# Patient Record
Sex: FEMALE | URBAN - METROPOLITAN AREA
[De-identification: names, ages, dates, MRNs, and addresses within clinical notes are randomized per-mention and may not be internally consistent; named-entity substitution may affect disease eponyms.]

---

## 2017-03-04 ENCOUNTER — OFFICE VISIT (OUTPATIENT)
Dept: URGENT CARE | Facility: URGENT CARE | Age: 27
End: 2017-03-04
Payer: COMMERCIAL

## 2017-03-04 ENCOUNTER — TELEPHONE (OUTPATIENT)
Dept: NURSING | Facility: CLINIC | Age: 27
End: 2017-03-04

## 2017-03-04 VITALS
WEIGHT: 126.13 LBS | TEMPERATURE: 97.7 F | SYSTOLIC BLOOD PRESSURE: 115 MMHG | DIASTOLIC BLOOD PRESSURE: 83 MMHG | HEART RATE: 78 BPM | BODY MASS INDEX: 21.31 KG/M2

## 2017-03-04 DIAGNOSIS — L20.89 OTHER ATOPIC DERMATITIS: Primary | ICD-10-CM

## 2017-03-04 DIAGNOSIS — L29.9 ITCHING: ICD-10-CM

## 2017-03-04 DIAGNOSIS — J30.89 SEASONAL ALLERGIC RHINITIS DUE TO OTHER ALLERGIC TRIGGER: ICD-10-CM

## 2017-03-04 PROCEDURE — 99214 OFFICE O/P EST MOD 30 MIN: CPT | Performed by: PHYSICIAN ASSISTANT

## 2017-03-04 RX ORDER — CETIRIZINE HYDROCHLORIDE 10 MG/1
10 TABLET ORAL EVERY EVENING
Qty: 30 TABLET | Refills: 1 | Status: SHIPPED | OUTPATIENT
Start: 2017-03-04 | End: 2017-06-15

## 2017-03-04 RX ORDER — METHYLPREDNISOLONE 4 MG
TABLET, DOSE PACK ORAL
Qty: 21 TABLET | Refills: 0 | Status: SHIPPED | OUTPATIENT
Start: 2017-03-04 | End: 2017-03-04

## 2017-03-04 RX ORDER — PERMETHRIN 50 MG/G
CREAM TOPICAL
Qty: 60 G | Refills: 1 | Status: SHIPPED | OUTPATIENT
Start: 2017-03-04 | End: 2017-06-15

## 2017-03-04 NOTE — MR AVS SNAPSHOT
"              After Visit Summary   3/4/2017    Mira Mariee    MRN: [id number]           Patient Information     Date Of Birth          1990        Visit Information        Provider Department      3/4/2017 10:15 AM Toribio Nicole PA-C [facility]        Today's Diagnoses     Other atopic dermatitis    -  1    Itching        Seasonal allergic rhinitis due to other allergic trigger           Follow-ups after your visit        Who to contact     If you have questions or need follow up information about today's clinic visit or your schedule please contact [facility] directly at [phone].  Normal or non-critical lab and imaging results will be communicated to you by "[employer]hart, letter or phone within 4 business days after the clinic has received the results. If you do not hear from us within 7 days, please contact the clinic through "[employer]hart or phone. If you have a critical or abnormal lab result, we will notify you by phone as soon as possible.  Submit refill requests through [employer] or call your pharmacy and they will forward the refill request to us. Please allow 3 business days for your refill to be completed.          Additional Information About Your Visit        MyChart Information     [employer] lets you send messages to your doctor, view your test results, renew your prescriptions, schedule appointments and more. To sign up, go to [email or web address] . Click on \"Log in\" on the left side of the screen, which will take you to the Welcome page. Then click on \"Sign up Now\" on the right side of the page.     You will be asked to enter the access code listed below, as well as some personal information. Please follow the directions to create your username and password.     Your access code is: [email or web address]  Expires: 2017 10:53 AM     Your access code will  in 90 days. If you need help or a new code, please call your [facility] clinic " or [phone].        Care EveryWhere ID     This is your Care EveryWhere ID. This could be used by other organizations to access your [place] medical records  [id number]        Your Vitals Were     Pulse Temperature BMI (Body Mass Index)             78 97.7  F (36.5  C) (Oral) 21.31 kg/m2          Blood Pressure from Last 3 Encounters:   03/04/17 115/83   03/31/16 110/70   10/29/15 112/73    Weight from Last 3 Encounters:   03/04/17 126 lb 2 oz (57.2 kg)   03/31/16 131 lb (59.4 kg)   10/29/15 139 lb (63 kg)              Today, you had the following     No orders found for display         Today's Medication Changes          These changes are accurate as of: 3/4/17 10:53 AM.  If you have any questions, ask your nurse or doctor.               Start taking these medicines.        Dose/Directions    cetirizine 10 MG tablet   Commonly known as:  zyrTEC   Used for:  Other atopic dermatitis, Itching   Started by:  Toribio Nicole PA-C        Dose:  10 mg   Take 1 tablet (10 mg) by mouth every evening   Quantity:  30 tablet   Refills:  1       permethrin 5 % cream   Commonly known as:  ELIMITE   Used for:  Itching   Started by:  Toribio Nicole PA-C        Apply cream from head to toe (except the face); leave on for 8-14 hours then wash off with water; reapply in 1 week if live mites appear.   Quantity:  60 g   Refills:  1            Where to get your medicines      These medications were sent to [facility] [id number] [place] [address] [address] [place] [facility] [address], [facility] [address]    Hours:  24-hours Phone:  [phone]     cetirizine 10 MG tablet    permethrin 5 % cream                Primary Care Provider Office Phone # Fax #    Marcia Edmond [phone]       [facility] [address] [place] [address]        Thank you!     Thank you for choosing [facility]  for your care. Our goal is always to  provide you with excellent care. Hearing back from our patients is one way we can continue to improve our services. Please take a few minutes to complete the written survey that you may receive in the mail after your visit with us. Thank you!             Your Updated Medication List - Protect others around you: Learn how to safely use, store and throw away your medicines at www.disposemymeds.org.          This list is accurate as of: 3/4/17 10:53 AM.  Always use your most recent med list.                   Brand Name Dispense Instructions for use    cetirizine 10 MG tablet    zyrTEC    30 tablet    Take 1 tablet (10 mg) by mouth every evening       IBUPROFEN PO      Reported on 3/4/2017       NO ACTIVE MEDICATIONS      .       permethrin 5 % cream    ELIMITE    60 g    Apply cream from head to toe (except the face); leave on for 8-14 hours then wash off with water; reapply in 1 week if live mites appear.       TRAMADOL HCL PO      Reported on 3/4/2017

## 2017-03-04 NOTE — TELEPHONE ENCOUNTER
"Call Type: Triage Call    Presenting Problem: Patient is \"itching all over,\" and has a \"rash\"  on\" chest\" and\" back along arms,\" and has \"tracks on chest area.\"  Patient states her daughter has the same symptoms and rash also.  Triaged for rash/Disosition to see provider within 24 hours.  Triage Note:  Guideline Title: Rash  Recommended Disposition: See Provider within 24 hours  Original Inclination: Wanted to speak with a nurse  Override Disposition:  Intended Action: See Dr/Demarcus Appt  Physician Contacted: No  Severe or persistent itching that interferes with ability to carry out usual  activities or sleep ?  YES  Rash with target-like appearance ? NO  Pregnant AND possible exposure to rubella ? NO  Severe breathing problems ? NO  Breathing problems ? NO  Known or suspected exposure to Poison [place], [place] OR [place] ? NO  Multiple red, fluid-filled lesions AND symptoms of upper respiratory infection  (URI) ? NO  New or worsening signs and symptoms that may indicate shock ? NO  Previously confirmed diagnosis of athlete's foot and similar symptoms ? NO  Known herpes zoster or undiagnosed blister-like rash on or near eye or on tip of  nose ? NO  Evaluated by provider AND symptoms worsening when following recommended treatment  plan ? NO  New rash/eruptions AND any temperature elevation in an immunocompromised  individual or frail elderly ? NO  Signs/symptoms of anaphylaxis ? NO  Possible exposure to chickenpox or has rash that looks like chickenpox ? NO  New onset of joint swelling or pain ? NO  Pregnant and rash ? NO  Known insect bite or sting ? NO  Skin changes that looks like hives (itchy welts or wheals) ? NO  Jaundice (yellowish tint to skin or whites of eyes) that is worsening or not  previously evaluated. ? NO  History of severe reaction after previous similar exposure to known allergen ? NO  First occurrence of mild allergic reaction (rash; hives; itching; nasal  congestion; watery, red eyes) that occurs within " minutes to several hours after  exposure to an allergen AND without any other signs or symptoms of anaphylaxis. ?  NO  Generalized rash AND not responding to 8 hours of home care ? NO  New onset of rash after beginning new prescribed, nonprescribed, or  alternative/complementary medication within last 10 days ? NO  Unexplained blood-colored (purple or red) flat pinpoint dots, spots or patches on  the skin ? NO  Foot or toe signs or symptoms complicated by diagnosed diabetes mellitus. ? NO  Any new OR worsening signs and symptoms of soft tissue infection ? NO  Physician Instructions:  Care Advice: Apply cool compresses for 30 minutes 4 to 6 times a day or take  bath/shower in cool water to relieve itching. Do not use hot water as this  may aggravate itching. Follow with application of a bland lotion such as  calamine (do not apply to the eyes or genitals).  Avoid sharing linens such as pillows, towels, or washcloths.  Speak with your provider before using an over-the-counter preparation(s) to  treat symptoms.  Call  if patient develops fainting  difficulty breathing or wheezing  stridor  continuous cough or sneezing  chest pain or tightness  hoarseness  facial swelling  swollen lips, mouth, tongue or throat  difficulty swallowing  generalized flushing, redness  new visual disturbance  or repeated vomiting.  CAUTIONS  Call provider for severe itching that is not relieved with home care  treatments.  Keep fingernails short.  For symptom relief, consider nonprescription antihistamines (such as  Allerest, Allegra, Claritin, Zyrtec, Chlor-Trimetron, Benadryl, etc.) as  directed on label or by pharmacist. Drowsiness may result, especially in  geriatric patients. Non-sedating antihistamines are available without a  prescription.  Reduce the risk of infection by washing your hands briskly using warm water  and soap for at least 20 seconds, or use a 62% alcohol-based hand   before or after touching the area.

## 2017-03-04 NOTE — PROGRESS NOTES
SUBJECTIVE:   Mira Mariee is a 26 year old female presenting with a chief complaint of allergies and now itching.  Onset of symptoms was 1-2 weeks  ago.  Course of illness is worsening.    Severity moderate  Current and Associated symptoms: rash on askin  Treatment measures tried include none.  Predisposing factors include cat allergy.    PMH:  Seasonal allergies     Allergies   Allergen Reactions     Advair [Advair Diskus]      Made chest tightness worse     Animal Dander      Nuts      Trees          Social History   Substance Use Topics     Smoking status: Current Every Day Smoker     Packs/day: 0.50     Years: 8.00     Types: Cigarettes     Smokeless tobacco: Never Used      Comment: .5 pack/day and E cig     Alcohol use No       ROS:  CONSTITUTIONAL:NEGATIVE for fever, chills, change in weight  INTEGUMENTARY/SKIN: POSITIVE for scratches on skin  ENT/MOUTH: NEGATIVE for ear, mouth and throat problems  RESP:NEGATIVE for significant cough or SOB  CV: NEGATIVE for chest pain, palpitations or peripheral edema  GI: NEGATIVE for nausea, abdominal pain, heartburn, or change in bowel habits  MUSCULOSKELETAL: NEGATIVE for significant arthralgias or myalgia  NEURO: NEGATIVE for weakness, dizziness or paresthesias    OBJECTIVE  :/83 (BP Location: Left arm, Patient Position: Chair, Cuff Size: Adult Regular)  Pulse 78  Temp 97.7  F (36.5  C) (Oral)  Wt 126 lb 2 oz (57.2 kg)  BMI 21.31 kg/m2  GENERAL APPEARANCE: healthy, alert and no distress  EYES: EOMI,  PERRL, conjunctiva clear  HENT: ear canals and TM's normal.  Nose and mouth without ulcers, erythema or lesions  NECK: supple, nontender, no lymphadenopathy  RESP: lungs clear to auscultation - no rales, rhonchi or wheezes  CV: regular rates and rhythm, normal S1 S2, no murmur noted  NEURO: Normal strength and tone, sensory exam grossly normal,  normal speech and mentation  SKIN: Positive for scratches on skin    ASSESSMENT/PLAN;      ICD-10-CM    1. Other  atopic dermatitis L20.89 cetirizine (ZYRTEC) 10 MG tablet     DISCONTINUED: methylPREDNISolone (MEDROL DOSEPAK) 4 MG tablet   2. Itching L29.9 cetirizine (ZYRTEC) 10 MG tablet     permethrin (ELIMITE) 5 % cream     DISCONTINUED: methylPREDNISolone (MEDROL DOSEPAK) 4 MG tablet   3. Seasonal allergic rhinitis due to other allergic trigger J30.89 cetirizine (ZYRTEC) 10 MG tablet       Due to her daughter itching, partner and mom itching will cover her and her daughter with elimite in the event there is some underlying scabies  Follow up with derm

## 2017-03-04 NOTE — NURSING NOTE
"Chief Complaint   Patient presents with     Derm Problem     itchy skin over various parts of body.        Initial /83 (BP Location: Left arm, Patient Position: Chair, Cuff Size: Adult Regular)  Pulse 78  Temp 97.7  F (36.5  C) (Oral)  Wt 126 lb 2 oz (57.2 kg)  BMI 21.31 kg/m2 Estimated body mass index is 21.31 kg/(m^2) as calculated from the following:    Height as of 3/31/16: 5' 4.5\" (1.638 m).    Weight as of this encounter: 126 lb 2 oz (57.2 kg).  Medication Reconciliation: complete    "

## 2017-04-26 ENCOUNTER — TELEPHONE (OUTPATIENT)
Dept: DERMATOLOGY | Facility: CLINIC | Age: 27
End: 2017-04-26

## 2017-04-26 NOTE — TELEPHONE ENCOUNTER
Reason for Call:  Other     Detailed comments: Pt was seen yesterday at [facility] in [place] and referred her to see a dermatologist for a staph infection all over her body. She has been taking SMX/TMP and it is not working. She has an appt for 05/08 with Dr. Mark - would like to get in sooner - Please advise.     Phone Number Patient can be reached at: Home number on file [phone] (home)    Best Time: Any    Can we leave a detailed message on this number? YES    Call taken on 4/26/2017 at 9:51 AM by Denise Behrendt

## 2017-04-26 NOTE — TELEPHONE ENCOUNTER
Spoke with patient who stated she had been diagnosed with a staff infection on her skin and has been treated with bactrim with no success. Patient stated she has a referral from her PCP to be seen in Derm. Patient stated the rash is getting worse on her and her daughter who is also being treated for staff but was never tested. She reported that her daughters rash is also getting worse. Appt was made for Friday and daughter was scheduled as well at same time. Mother stated records would be faxed.  Briana LEON RN BSN PHN  Specialty Clinics

## 2017-04-28 ENCOUNTER — TELEPHONE (OUTPATIENT)
Dept: DERMATOLOGY | Facility: CLINIC | Age: 27
End: 2017-04-28

## 2017-04-28 NOTE — TELEPHONE ENCOUNTER
Called pt and was unable to leave message as voicemail was full. Pt had appt for her and daughter this morning and no showed.  schedule today later. Pt would need to call PCP or go to urgent care for treatment. Pt may make appt for next available.   Briana LEON RN BSN PHN  Specialty Clinics

## 2017-04-28 NOTE — TELEPHONE ENCOUNTER
Reason for Call:  Patient is calling in states that the itching is getting worse. Patient is tearful    Detailed comments: please advise    Phone Number Patient can be reached at: Home number on file [phone] (home)    Best Time: any    Can we leave a detailed message on this number? YES    Call taken on 4/28/2017 at 11:38 AM by Grace Nash

## 2017-04-28 NOTE — TELEPHONE ENCOUNTER
Spoke with pt and she reported that she went to the ED near her home and received prednisone. Appt was made for pt on 5/3/17 and pt stated she would be here that day.  Pt reported that she wrote the wrong time down for her appt today. Advised pt to cancel appt on 5/8 if not needed. Pt verbalized understanding.  Briana LEON RN BSN PHN  Specialty Clinics

## 2017-05-03 ENCOUNTER — OFFICE VISIT (OUTPATIENT)
Dept: DERMATOLOGY | Facility: CLINIC | Age: 27
End: 2017-05-03
Payer: COMMERCIAL

## 2017-05-03 VITALS
BODY MASS INDEX: 22.68 KG/M2 | HEIGHT: 63 IN | SYSTOLIC BLOOD PRESSURE: 117 MMHG | WEIGHT: 128 LBS | HEART RATE: 76 BPM | DIASTOLIC BLOOD PRESSURE: 74 MMHG

## 2017-05-03 DIAGNOSIS — L29.9 ITCHING: Primary | ICD-10-CM

## 2017-05-03 LAB
ALBUMIN SERPL-MCNC: 3.6 G/DL (ref 3.4–5)
ALBUMIN UR-MCNC: NEGATIVE MG/DL
ALP SERPL-CCNC: 48 U/L (ref 40–150)
ALT SERPL W P-5'-P-CCNC: 23 U/L (ref 0–50)
ANION GAP SERPL CALCULATED.3IONS-SCNC: 7 MMOL/L (ref 3–14)
APPEARANCE UR: NORMAL
AST SERPL W P-5'-P-CCNC: 15 U/L (ref 0–45)
BILIRUB SERPL-MCNC: 0.2 MG/DL (ref 0.2–1.3)
BILIRUB UR QL STRIP: NEGATIVE
BUN SERPL-MCNC: 12 MG/DL (ref 7–30)
CALCIUM SERPL-MCNC: 8.2 MG/DL (ref 8.5–10.1)
CHLORIDE SERPL-SCNC: 104 MMOL/L (ref 94–109)
CO2 SERPL-SCNC: 26 MMOL/L (ref 20–32)
COLOR UR AUTO: YELLOW
CREAT SERPL-MCNC: 0.7 MG/DL (ref 0.52–1.04)
ERYTHROCYTE [DISTWIDTH] IN BLOOD BY AUTOMATED COUNT: 13.8 % (ref 10–15)
FERRITIN SERPL-MCNC: 40 NG/ML (ref 12–150)
GFR SERPL CREATININE-BSD FRML MDRD: ABNORMAL ML/MIN/1.7M2
GLUCOSE SERPL-MCNC: 100 MG/DL (ref 70–99)
GLUCOSE UR STRIP-MCNC: NEGATIVE MG/DL
HCT VFR BLD AUTO: 40 % (ref 35–47)
HGB BLD-MCNC: 14 G/DL (ref 11.7–15.7)
HGB UR QL STRIP: NEGATIVE
IRON SATN MFR SERPL: 35 % (ref 15–46)
IRON SERPL-MCNC: 109 UG/DL (ref 35–180)
KETONES UR STRIP-MCNC: NEGATIVE MG/DL
LDH SERPL L TO P-CCNC: 208 U/L (ref 81–234)
LEUKOCYTE ESTERASE UR QL STRIP: NEGATIVE
MCH RBC QN AUTO: 29.4 PG (ref 26.5–33)
MCHC RBC AUTO-ENTMCNC: 35 G/DL (ref 31.5–36.5)
MCV RBC AUTO: 84 FL (ref 78–100)
MUCOUS THREADS #/AREA URNS LPF: ABNORMAL /LPF
NITRATE UR QL: NEGATIVE
NON-SQ EPI CELLS #/AREA URNS LPF: ABNORMAL /LPF
PH UR STRIP: 5.5 PH (ref 5–7)
PLATELET # BLD AUTO: 292 10E9/L (ref 150–450)
POTASSIUM SERPL-SCNC: 4 MMOL/L (ref 3.4–5.3)
PROT SERPL-MCNC: 6.8 G/DL (ref 6.8–8.8)
RBC # BLD AUTO: 4.76 10E12/L (ref 3.8–5.2)
RBC #/AREA URNS AUTO: ABNORMAL /HPF (ref 0–2)
SODIUM SERPL-SCNC: 137 MMOL/L (ref 133–144)
SP GR UR STRIP: >1.03 (ref 1–1.03)
TIBC SERPL-MCNC: 313 UG/DL (ref 240–430)
TSH SERPL DL<=0.005 MIU/L-ACNC: 0.68 MU/L (ref 0.4–4)
URATE CRY #/AREA URNS HPF: ABNORMAL /HPF
URN SPEC COLLECT METH UR: NORMAL
UROBILINOGEN UR STRIP-ACNC: 1 EU/DL (ref 0.2–1)
WBC # BLD AUTO: 7.5 10E9/L (ref 4–11)
WBC #/AREA URNS AUTO: ABNORMAL /HPF (ref 0–2)

## 2017-05-03 PROCEDURE — 86704 HEP B CORE ANTIBODY TOTAL: CPT | Performed by: DERMATOLOGY

## 2017-05-03 PROCEDURE — 84630 ASSAY OF ZINC: CPT | Mod: 90 | Performed by: DERMATOLOGY

## 2017-05-03 PROCEDURE — 84425 ASSAY OF VITAMIN B-1: CPT | Mod: 90 | Performed by: DERMATOLOGY

## 2017-05-03 PROCEDURE — 99203 OFFICE O/P NEW LOW 30 MIN: CPT | Mod: 25 | Performed by: DERMATOLOGY

## 2017-05-03 PROCEDURE — 81001 URINALYSIS AUTO W/SCOPE: CPT | Performed by: DERMATOLOGY

## 2017-05-03 PROCEDURE — 84443 ASSAY THYROID STIM HORMONE: CPT | Performed by: DERMATOLOGY

## 2017-05-03 PROCEDURE — 00000402 ZZHCL STATISTIC TOTAL PROTEIN: Performed by: DERMATOLOGY

## 2017-05-03 PROCEDURE — 80053 COMPREHEN METABOLIC PANEL: CPT | Performed by: DERMATOLOGY

## 2017-05-03 PROCEDURE — 36415 COLL VENOUS BLD VENIPUNCTURE: CPT | Performed by: DERMATOLOGY

## 2017-05-03 PROCEDURE — 83540 ASSAY OF IRON: CPT | Performed by: DERMATOLOGY

## 2017-05-03 PROCEDURE — 85027 COMPLETE CBC AUTOMATED: CPT | Performed by: DERMATOLOGY

## 2017-05-03 PROCEDURE — 83550 IRON BINDING TEST: CPT | Performed by: DERMATOLOGY

## 2017-05-03 PROCEDURE — 82728 ASSAY OF FERRITIN: CPT | Performed by: DERMATOLOGY

## 2017-05-03 PROCEDURE — 88305 TISSUE EXAM BY PATHOLOGIST: CPT | Performed by: DERMATOLOGY

## 2017-05-03 PROCEDURE — 11100 HC BIOPSY SKIN/SUBQ/MUC MEM, SINGLE LESION: CPT | Performed by: DERMATOLOGY

## 2017-05-03 PROCEDURE — 88342 IMHCHEM/IMCYTCHM 1ST ANTB: CPT | Performed by: DERMATOLOGY

## 2017-05-03 PROCEDURE — 84165 PROTEIN E-PHORESIS SERUM: CPT | Performed by: DERMATOLOGY

## 2017-05-03 PROCEDURE — 88312 SPECIAL STAINS GROUP 1: CPT | Performed by: DERMATOLOGY

## 2017-05-03 PROCEDURE — 83615 LACTATE (LD) (LDH) ENZYME: CPT | Performed by: DERMATOLOGY

## 2017-05-03 PROCEDURE — 88341 IMHCHEM/IMCYTCHM EA ADD ANTB: CPT | Performed by: DERMATOLOGY

## 2017-05-03 PROCEDURE — 86803 HEPATITIS C AB TEST: CPT | Performed by: DERMATOLOGY

## 2017-05-03 PROCEDURE — 99000 SPECIMEN HANDLING OFFICE-LAB: CPT | Performed by: DERMATOLOGY

## 2017-05-03 RX ORDER — CETIRIZINE HYDROCHLORIDE 10 MG/1
TABLET ORAL
Qty: 90 TABLET | Refills: 3 | Status: SHIPPED | OUTPATIENT
Start: 2017-05-03 | End: 2017-06-15

## 2017-05-03 RX ORDER — DOXEPIN HYDROCHLORIDE 50 MG/1
50 CAPSULE ORAL AT BEDTIME
Qty: 60 CAPSULE | Refills: 2 | Status: SHIPPED | OUTPATIENT
Start: 2017-05-03 | End: 2017-06-15

## 2017-05-03 RX ORDER — SULFAMETHOXAZOLE AND TRIMETHOPRIM 200; 40 MG/5ML; MG/5ML
10 SUSPENSION ORAL 2 TIMES DAILY
COMMUNITY
End: 2017-06-15

## 2017-05-03 NOTE — PROGRESS NOTES
"Mira Mariee is a 26 year old year old female patient here today for itching all over.   .  Patient states this has been present for monhts.  Patient reports the following symptoms:  Itching, \"bugs crawling under my skin\".  Patient reports the following previous treatments benadryl.  Patient reports the following modifying factors none.  Associated symptoms: none.  Patient has no other skin complaints today.  Remainder of the HPI, Meds, PMH, Allergies, FH, and SH was reviewed in chart.    History reviewed. No pertinent past medical history.    History reviewed. No pertinent surgical history.     Family History   Problem Relation Age of Onset     DIABETES Mother      Hypertension Mother      Coronary Artery Disease No family hx of      Breast Cancer No family hx of      Colon Cancer No family hx of      Prostate Cancer No family hx of      Other Cancer No family hx of        Social History     Social History     Marital status: Single     Spouse name: N/A     Number of children: N/A     Years of education: N/A     Occupational History     Not on file.     Social History Main Topics     Smoking status: Current Every Day Smoker     Packs/day: 1.00     Years: 8.00     Types: Cigarettes     Smokeless tobacco: Never Used      Comment: 1 pack/day and E cig     Alcohol use No     Drug use: No     Sexual activity: No     Other Topics Concern     Not on file     Social History Narrative       Outpatient Encounter Prescriptions as of 5/3/2017   Medication Sig Dispense Refill     sulfamethoxazole-trimethoprim (BACTRIM/SEPTRA) suspension Take 10 mg/kg/day by mouth 2 times daily Dose based on TMP component.       HYDROXYZINE HCL PO Take 25 mg by mouth daily       doxepin (SINEQUAN) 50 MG capsule Take 1 capsule (50 mg) by mouth At Bedtime 60 capsule 2     cetirizine (ZYRTEC) 10 MG tablet Two tablets in morning 90 tablet 3     cetirizine (ZYRTEC) 10 MG tablet Take 1 tablet (10 mg) by mouth every evening (Patient not " "taking: Reported on 3/4/2017) 30 tablet 1     permethrin (ELIMITE) 5 % cream Apply cream from head to toe (except the face); leave on for 8-14 hours then wash off with water; reapply in 1 week if live mites appear. (Patient not taking: Reported on 5/3/2017) 60 g 1     IBUPROFEN PO Reported on 3/4/2017       TRAMADOL HCL PO Reported on 5/3/2017       NO ACTIVE MEDICATIONS .       No facility-administered encounter medications on file as of 5/3/2017.              Review Of Systems  Skin: As above  Eyes: negative  Ears/Nose/Throat: negative  Respiratory: No shortness of breath, dyspnea on exertion, cough, or hemoptysis  Cardiovascular: negative  Gastrointestinal: negative  Genitourinary: negative  Musculoskeletal: negative  Neurologic: negative  Psychiatric: negative  Hematologic/Lymphatic/Immunologic: negative  Endocrine: negative      O:   NAD, WDWN, Alert & Oriented, Mood & Affect wnl, Vitals stable   Here today alone   /74 (BP Location: Left arm)  Pulse 76  Ht 1.6 m (5' 3\")  Wt 58.1 kg (128 lb)  BMI 22.67 kg/m2   General appearance normal   Vitals stable   Alert, oriented and visably crying, anxious, moving on exam table      Following lymph nodes palpated: Occipital, Cervical, Supraclavicular n olad   Excoriations on scalp and trunk no primary skin lesions         The remainder of expanded problem focused exam was unremarkable; the following areas were examined:  scalp/hair, conjunctiva/lids, face, neck, lips, chest, digits/nails, RUE, LUE.      Eyes: Conjunctivae/lids:Normal     ENT: Lips, buccal mucosa, tongue: normal    MSK:Normal    Cardiovascular: peripheral edema none    Pulm: Breathing Normal    Lymph Nodes: No Head and Neck Lymphadenopathy     Neuro/Psych: Orientation:Normal; Mood/Affect:Normal      A/P:  1. Excoriations  TANGENTIAL BIOPSY SENT OUT:  After consent, anesthesia with LEC and prep, tangential excision performed and specimen sent out for permanent section histology.  No complications " and routine wound care. Patient told to call our office in 1-2 weeks for result.      R/o fungus v excoriation v folliculitis   Check cbc, iron, tsh, cmp, urine a, hep panel, ldh, spep  doexpin bedtime  Zyrtec in monring  F/u pending results  Skin care regimen reviewed with patient: Eliminate harsh soaps, i.e. Dial, zest, irsih spring; Mild soaps such as Cetaphil or Dove sensitive skin, avoid hot or cold showers, aggressive use of emollients including vanicream, cetaphil or cerave discussed with patient.

## 2017-05-03 NOTE — NURSING NOTE
"Chief Complaint   Patient presents with     Derm Problem     present for months. all over. DX with staph infection in April. Not getting better. taking smx/tmp and hydroxyzine       Initial /74 (BP Location: Left arm)  Pulse 76  Ht 1.6 m (5' 3\")  Wt 58.1 kg (128 lb)  BMI 22.67 kg/m2 Estimated body mass index is 22.67 kg/(m^2) as calculated from the following:    Height as of this encounter: 1.6 m (5' 3\").    Weight as of this encounter: 58.1 kg (128 lb).  Medication Reconciliation: complete    "

## 2017-05-03 NOTE — PATIENT INSTRUCTIONS
We did a biopsy and blood work today. We have sent RX to the pharmacy to help with the itching. Start the medication for now. We will contact you over the next few days with your test results.  Stop the SMX and the Hydroxyzine.     Proper skin care from Dr. Mark- [facility]     Eliminate harsh soaps, i.e. Dial, Zest, [place] Spring;   Use mild soaps such as Cetaphil or Dove Sensitive Skin   Avoid hot or cold showers   After showering, lightly dry off.    Aggressive use of a moisturizer (including Vanicream, Cetaphil, Aquafor or Cerave)   We recommend using a tub that needs to be scooped out, not a pump. This has more of an oil base. It will hold moisture in your skin much better than a water base moisturizer. The ones recommended are non- pore clogging.       If you have any questions call [phone] and follow the prompts to Dr. Mark's office.

## 2017-05-03 NOTE — MR AVS SNAPSHOT
After Visit Summary   5/3/2017    Mira Mariee    MRN: [id number]           Patient Information     Date Of Birth          1990        Visit Information        Provider Department      5/3/2017 12:45 PM Orville Mark MD [facility]        Care Instructions    We did a biopsy and blood work today. We have sent RX to the pharmacy to help with the itching. Start the medication for now. We will contact you over the next few days with your test results.  Stop the SMX and the Hydroxyzine.     Proper skin care from Dr. Mark- [facility]     Eliminate harsh soaps, i.e. Dial, Zest, Beryl Spring;   Use mild soaps such as Cetaphil or Dove Sensitive Skin   Avoid hot or cold showers   After showering, lightly dry off.    Aggressive use of a moisturizer (including Vanicream, Cetaphil, Aquafor or Cerave)   We recommend using a tub that needs to be scooped out, not a pump. This has more of an oil base. It will hold moisture in your skin much better than a water base moisturizer. The ones recommended are non- pore clogging.       If you have any questions call [phone] and follow the prompts to Dr. Mark's office.          Follow-ups after your visit        Your next 10 appointments already scheduled     May 08, 2017 12:45 PM CDT   New Visit with Orville Mark MD   [facility] ([facility])    [address] [address] [phone]              Who to contact     If you have questions or need follow up information about today's clinic visit or your schedule please contact [facility] directly at [phone].  Normal or non-critical lab and imaging results will be communicated to you by MyChart, letter or phone within 4 business days after the clinic has received the results. If you do not hear from us within 7 days, please contact the clinic through MyChart or phone. If you have a critical or  "abnormal lab result, we will notify you by phone as soon as possible.  Submit refill requests through [employer] or call your pharmacy and they will forward the refill request to us. Please allow 3 business days for your refill to be completed.          Additional Information About Your Visit        MyChart Information     [employer] lets you send messages to your doctor, view your test results, renew your prescriptions, schedule appointments and more. To sign up, go to [email or web address] . Click on \"Log in\" on the left side of the screen, which will take you to the Welcome page. Then click on \"Sign up Now\" on the right side of the page.     You will be asked to enter the access code listed below, as well as some personal information. Please follow the directions to create your username and password.     Your access code is: [email or web address]  Expires: 2017 11:53 AM     Your access code will  in 90 days. If you need help or a new code, please call your [place] clinic or [phone].        Care EveryWhere ID     This is your Care EveryWhere ID. This could be used by other organizations to access your [place] medical records  [id number]        Your Vitals Were     Pulse Height BMI (Body Mass Index)             76 1.6 m (5' 3\") 22.67 kg/m2          Blood Pressure from Last 3 Encounters:   17 117/74   17 115/83   16 110/70    Weight from Last 3 Encounters:   17 58.1 kg (128 lb)   17 57.2 kg (126 lb 2 oz)   16 59.4 kg (131 lb)              Today, you had the following     No orders found for display       Primary Care Provider Office Phone # Fax #    Marcia Edmond [phone]       [facility] [address] [place] [address]        Thank you!     Thank you for choosing [facility]  for your care. Our goal is always to provide you with excellent care. Hearing back from our patients is one way we can continue to improve our " services. Please take a few minutes to complete the written survey that you may receive in the mail after your visit with us. Thank you!             Your Updated Medication List - Protect others around you: Learn how to safely use, store and throw away your medicines at www.disposemymeds.org.          This list is accurate as of: 5/3/17  1:01 PM.  Always use your most recent med list.                   Brand Name Dispense Instructions for use    cetirizine 10 MG tablet    zyrTEC    30 tablet    Take 1 tablet (10 mg) by mouth every evening       HYDROXYZINE HCL PO      Take 25 mg by mouth daily       IBUPROFEN PO      Reported on 3/4/2017       NO ACTIVE MEDICATIONS      .       permethrin 5 % cream    ELIMITE    60 g    Apply cream from head to toe (except the face); leave on for 8-14 hours then wash off with water; reapply in 1 week if live mites appear.       sulfamethoxazole-trimethoprim suspension    BACTRIM/SEPTRA     Take 10 mg/kg/day by mouth 2 times daily Dose based on TMP component.       TRAMADOL HCL PO      Reported on 5/3/2017

## 2017-05-04 LAB
ALBUMIN SERPL ELPH-MCNC: 4.1 G/DL (ref 3.7–5.1)
ALPHA1 GLOB SERPL ELPH-MCNC: 0.2 G/DL (ref 0.2–0.4)
ALPHA2 GLOB SERPL ELPH-MCNC: 0.6 G/DL (ref 0.5–0.9)
B-GLOBULIN SERPL ELPH-MCNC: 0.7 G/DL (ref 0.6–1)
GAMMA GLOB SERPL ELPH-MCNC: 1.1 G/DL (ref 0.7–1.6)
HBV CORE AB SERPL QL IA: NONREACTIVE
HCV AB SERPL QL IA: NORMAL
M PROTEIN SERPL ELPH-MCNC: 0 G/DL
PROT PATTERN SERPL ELPH-IMP: NORMAL
ZINC SERPL-MCNC: 62 UG/ML

## 2017-05-06 LAB — VIT B1 BLD-MCNC: 100 UG/DL

## 2017-05-08 LAB — COPATH REPORT: NORMAL

## 2017-05-09 ENCOUNTER — TELEPHONE (OUTPATIENT)
Dept: DERMATOLOGY | Facility: CLINIC | Age: 27
End: 2017-05-09

## 2017-05-09 DIAGNOSIS — L29.9 ITCHING: Primary | ICD-10-CM

## 2017-05-09 NOTE — TELEPHONE ENCOUNTER
Result note from earlier today:    Notes Recorded by Elodia Villalta RN on 5/9/2017 at 3:37 PM  Advised.  suggested she make return appt because since stopping the Itch cream she has gotten worse with current treatment.  Elodia Gibson RN  [facility]

## 2017-05-09 NOTE — TELEPHONE ENCOUNTER
Reason for Call:  Other     Detailed comments: Pt was told soonest opening with Dr. Mark is in 2 weeks - She would like to be worked into the schedule sooner. Pt is experiencing itching, bumps that come and go all over her body (mostly on face, neck, chest). Stopped using anti-itch cream a week ago. She now has open wounds from itching. She wants to find out why she is itching. - Please advise    Phone Number Patient can be reached at: Home number on file [phone] (home)    Best Time: Any    Can we leave a detailed message on this number? No voice mail available    Call taken on 5/9/2017 at 3:46 PM by Denise Behrendt

## 2017-05-10 RX ORDER — PERMETHRIN 50 MG/G
CREAM TOPICAL
Qty: 60 G | Refills: 4 | Status: SHIPPED | OUTPATIENT
Start: 2017-05-10 | End: 2017-06-15

## 2017-05-10 NOTE — TELEPHONE ENCOUNTER
Called pt and voicemail is not available. Will await pt's call back.  Briana LEON RN BSN PHN  Specialty Clinics

## 2017-05-10 NOTE — TELEPHONE ENCOUNTER
"Pt called back regarding note below. Informed of Dr Mark's recommendations. Pt states is not anxiety as her boyfriend has had the same symptoms for 2 months and now her boss has the same. Pt states \"I just want to know what I have\". Pt does not know what she should do. Writer explained light box therapy and pt wanted to discuss with her boss before deciding. Offered to schedule office visit to further discuss multiple questions. Pt declined at this time until she is able to touch base with employer.     Informed pt that we would alert Dr Mark and she will call \"later\" with her decision. Also states she may transfer care for another opinion.   Chica SIMMONS RN  [facility]    "

## 2017-05-10 NOTE — TELEPHONE ENCOUNTER
I am happy to provide a referral to [facility] If she would like that    I have also sent a presumptive cream to treat for scabies if her boyfriend is that itchy, I would recommend he get seen as well.    I did not see evidence of scabies on exam or on biopsy but we could try that as well

## 2017-05-10 NOTE — TELEPHONE ENCOUNTER
Called pt and no voicemail has been set up and no answer.  Briana LEON RN BSN PHN  Specialty Clinics

## 2017-05-10 NOTE — TELEPHONE ENCOUNTER
"At this point everything is negative. There are no signs of infection or skin pathology or systemic pathology for the itching.    At this point we must consider that it may be related to \"nerves\" or anxiety.      If she would like I can try and send a different medication to try and help with the itching or we could start NBUVB light treatments that may help with this.    Does she have a preference  "

## 2017-05-11 ENCOUNTER — TELEPHONE (OUTPATIENT)
Dept: DERMATOLOGY | Facility: CLINIC | Age: 27
End: 2017-05-11

## 2017-05-11 NOTE — TELEPHONE ENCOUNTER
Spoke to patient who was advised of rx and that boyfriend show be seen as well. She accepted referral to [facility], so entered per Dr Mark notes below and given number to schedule as well. Zena Lyon RN

## 2017-05-11 NOTE — TELEPHONE ENCOUNTER
Spoke with patient and informed her if wanting to be seen sooner than the first available appointment she would need to have the referring provider speak with the resident on call. Patient states understanding. She was transferred to scheduling.

## 2017-05-11 NOTE — TELEPHONE ENCOUNTER
----- Message from Gretchen Pierre sent at 5/11/2017  8:50 AM CDT -----  Regarding: pt scheduling request- new pt  Contact: [phone]  Pt requesting to be seen sooner than first available on 6/15/2017 for a rash. Pt stated she should be seen sooner due to the severity of her symptoms. Pt declined scheduling first available, requested to speak to a nurse.    Pt can be reached at number above.    Thanks!- lel    Please DO NOT send this message and/or reply back to sender.  Call Center Representatives DO NOT respond to messages.

## 2017-05-12 NOTE — TELEPHONE ENCOUNTER
Called pt to offer appt on Monday as there was a cancellation.  Unable to leave message as there is no voicemail set up.   Briana LEON RN BSN PHN  Specialty Clinics

## 2017-05-16 NOTE — TELEPHONE ENCOUNTER
Dr Mark-     Do you feel patient needs a sooner than available (6-15-17) work in appt at [facility] for Itching?...    Patient stated she would need a Provider to Provider consult to get in sooner there...    Please advise. Zena Lyon RN

## 2017-06-15 ENCOUNTER — OFFICE VISIT (OUTPATIENT)
Dept: DERMATOLOGY | Facility: CLINIC | Age: 27
End: 2017-06-15

## 2017-06-15 DIAGNOSIS — L70.8 OTHER ACNE: ICD-10-CM

## 2017-06-15 DIAGNOSIS — L29.9 PRURITUS: ICD-10-CM

## 2017-06-15 DIAGNOSIS — R21 RASH: Primary | ICD-10-CM

## 2017-06-15 RX ORDER — MV-MIN/VIT C/GLUT/LYSINE/HB124 250-12.5MG
TABLET,CHEWABLE ORAL DAILY
COMMUNITY

## 2017-06-15 RX ORDER — LIDOCAINE HYDROCHLORIDE AND EPINEPHRINE 10; 10 MG/ML; UG/ML
3 INJECTION, SOLUTION INFILTRATION; PERINEURAL ONCE
Qty: 3 ML | Refills: 0 | OUTPATIENT
Start: 2017-06-15 | End: 2017-06-15

## 2017-06-15 RX ORDER — GABAPENTIN 300 MG/1
300 CAPSULE ORAL AT BEDTIME
Qty: 90 CAPSULE | Refills: 1 | Status: SHIPPED | OUTPATIENT
Start: 2017-06-15 | End: 2017-06-19

## 2017-06-15 RX ORDER — TRETINOIN 0.25 MG/G
CREAM TOPICAL
Qty: 45 G | Refills: 11 | Status: SHIPPED | OUTPATIENT
Start: 2017-06-15 | End: 2018-04-17

## 2017-06-15 ASSESSMENT — PAIN SCALES - GENERAL
PAINLEVEL: MODERATE PAIN (4)
PAINLEVEL: NO PAIN (0)

## 2017-06-15 NOTE — PATIENT INSTRUCTIONS
We are sorry you are so itchy.  We will take a skin biopsy today to see if we can get any clues for her itching.    One of the good things today is that we see no signs of any bug bites or infections.  We would like to take a chest x-ray to make sure lungs are not causing any problems on the skin.    One thing to try is a medication that works to stop abnormal nerve signaling called gabapentin. The main side effect is sleepiness. You can start 300 mg at bedtime. It takes a few weeks to fully work but if this is helping, we can increase the dose.    We want to help improve your normal skin barrier and would recommend a very gentle skin regimen.  Please bathe daily.  Immediately after bathing, pat dry, and then apply Vaseline (greasy) to all skin. If too greasy, you can use Cetaphil, Vanicream, Cerave or their generic versions.  For face, a gentle moisturizer is good - Marilou might be okay but I'm not about what is inside it. Other good options are Neutrogena, Cetaphil/Cerave/Vanicream.    For your face, there is some mild acne. For this, please start tretinoin 0.025% cream at bedtime. Apply pea-sized amount to face. This can be drying so please start every other night, then increase to nightly as tolerated after 1-2 weeks.    Return in 2 weeks or sooner if concerns.    Wound Care After a Biopsy    What is a skin biopsy?  A skin biopsy allows the doctor to examine a very small piece of tissue under the microscope to determine the diagnosis and the best treatment for the skin condition. A local anesthetic (numbing medicine)  is injected with a very small needle into the skin area to be tested. A small piece of skin is taken from the area. Sometimes a suture (stitch) is used.     What are the risks of a skin biopsy?  I will experience scar, bleeding, swelling, pain, crusting and redness. I may experience incomplete removal or recurrence. Risks of this procedure are excessive bleeding, bruising, infection, nerve damage,  numbness, thick (hypertrophic or keloidal) scar and non-diagnostic biopsy.    How should I care for my wound for the first 24 hours?    Keep the wound dry and covered for 24 hours    If it bleeds, hold direct pressure on the area for 15 minutes. If bleeding does not stop then go to the emergency room    Avoid strenuous exercise the first 1-2 days or as your doctor instructs you    How should I care for the wound after 24 hours?    After 24 hours, remove the bandage    You may bathe or shower as normal    If you had a scalp biopsy, you can shampoo as usual and can use shower water to clean the biopsy site daily    Clean the wound twice a day with gentle soap and water    Do not scrub, be gentle    Apply white petroleum/Vaseline after cleaning the wound with a cotton swab or a clean finger, and keep the site covered with a Bandaid /bandage. Bandages are not necessary with a scalp biopsy    If you are unable to cover the site with a Bandaid /bandage, re-apply ointment 2-3 times a day to keep the site moist. Moisture will help with healing    Avoid strenuous activity for first 1-2 days    Avoid lakes, rivers, pools, and oceans until the stitches are removed or the site is healed    How do I clean my wound?    Wash hands thoroughly with soap or use hand  before all wound care    Clean the wound with gentle soap and water    Apply white petroleum/Vaseline  to wound after it is clean    Replace the Bandaid /bandage to keep the wound covered for the first few days or as instructed by your doctor    If you had a scalp biopsy, warm shower water to the area on a daily basis should suffice    What should I use to clean my wound?     Cotton-tipped applicators (Qtips )    White petroleum jelly (Vaseline ). Use a clean new container and use Q-tips to apply.    Bandaids   as needed    Gentle soap     How should I care for my wound long term?    Do not get your wound dirty    Keep up with wound care for one week or until  the area is healed.    A small scab will form and fall off by itself when the area is completely healed. The area will be red and will become pink in color as it heals. Sun protection is very important for how your scar will turn out. Sunscreen with an SPF 30 or greater is recommended once the area is healed.    If you have stitches, stitches need to be removed in 14 days. You may return to our clinic for this or you may have it done locally at your doctor s office.    You should have some soreness but it should be mild and slowly go away over several days. Talk to your doctor about using tylenol for pain,    When should I call my doctor?  If you have increased:     Pain or swelling    Pus or drainage (clear or slightly yellow drainage is ok)    Temperature over 100F    Spreading redness or warmth around wound    When will I hear about my results?  The biopsy results can take 2-3 weeks to come back. The clinic will call you with the results, send you a [employer] message, or have you schedule a follow-up clinic or phone time to discuss the results. Contact our clinics if you do not hear from us in 3 weeks.     Who should I call with questions?    [facility]: [phone]     [facility]: [phone]    For urgent needs outside of business hours call the [facility] at [phone] and ask for the dermatology resident on call

## 2017-06-15 NOTE — NURSING NOTE
Dermatology Rooming Note    Mira Mariee's goals for this visit include:   Chief Complaint   Patient presents with     Derm Problem     Itchy rash all over body. Mira is currently only using Tea tree oil and an antibacterial spray.     Aleyda Kuhn, CMA

## 2017-06-15 NOTE — MR AVS SNAPSHOT
After Visit Summary   6/15/2017    Mira Mariee    MRN: [id number]           Patient Information     Date Of Birth          1990        Visit Information        Provider Department      6/15/2017 8:30 AM Alisa Elizabeth MD [facility] Dermatology        Today's Diagnoses     Rash    -  1    Pruritus        Other acne          Care Instructions    We are sorry you are so itchy.  We will take a skin biopsy today to see if we can get any clues for her itching.    One of the good things today is that we see no signs of any bug bites or infections.  We would like to take a chest x-ray to make sure lungs are not causing any problems on the skin.    One thing to try is a medication that works to stop abnormal nerve signaling called gabapentin. The main side effect is sleepiness. You can start 300 mg at bedtime. It takes a few weeks to fully work but if this is helping, we can increase the dose.    We want to help improve your normal skin barrier and would recommend a very gentle skin regimen.  Please bathe daily.  Immediately after bathing, pat dry, and then apply Vaseline (greasy) to all skin. If too greasy, you can use Cetaphil, Vanicream, Cerave or their generic versions.  For face, a gentle moisturizer is good - Marilou might be okay but I'm not about what is inside it. Other good options are Neutrogena, Cetaphil/Cerave/Vanicream.    For your face, there is some mild acne. For this, please start tretinoin 0.025% cream at bedtime. Apply pea-sized amount to face. This can be drying so please start every other night, then increase to nightly as tolerated after 1-2 weeks.    Return in 2 weeks or sooner if concerns.    Wound Care After a Biopsy    What is a skin biopsy?  A skin biopsy allows the doctor to examine a very small piece of tissue under the microscope to determine the diagnosis and the best treatment for the skin condition. A local anesthetic (numbing medicine)  is injected with a very  small needle into the skin area to be tested. A small piece of skin is taken from the area. Sometimes a suture (stitch) is used.     What are the risks of a skin biopsy?  I will experience scar, bleeding, swelling, pain, crusting and redness. I may experience incomplete removal or recurrence. Risks of this procedure are excessive bleeding, bruising, infection, nerve damage, numbness, thick (hypertrophic or keloidal) scar and non-diagnostic biopsy.    How should I care for my wound for the first 24 hours?    Keep the wound dry and covered for 24 hours    If it bleeds, hold direct pressure on the area for 15 minutes. If bleeding does not stop then go to the emergency room    Avoid strenuous exercise the first 1-2 days or as your doctor instructs you    How should I care for the wound after 24 hours?    After 24 hours, remove the bandage    You may bathe or shower as normal    If you had a scalp biopsy, you can shampoo as usual and can use shower water to clean the biopsy site daily    Clean the wound twice a day with gentle soap and water    Do not scrub, be gentle    Apply white petroleum/Vaseline after cleaning the wound with a cotton swab or a clean finger, and keep the site covered with a Bandaid /bandage. Bandages are not necessary with a scalp biopsy    If you are unable to cover the site with a Bandaid /bandage, re-apply ointment 2-3 times a day to keep the site moist. Moisture will help with healing    Avoid strenuous activity for first 1-2 days    Avoid lakes, rivers, pools, and oceans until the stitches are removed or the site is healed    How do I clean my wound?    Wash hands thoroughly with soap or use hand  before all wound care    Clean the wound with gentle soap and water    Apply white petroleum/Vaseline  to wound after it is clean    Replace the Bandaid /bandage to keep the wound covered for the first few days or as instructed by your doctor    If you had a scalp biopsy, warm shower water to  the area on a daily basis should suffice    What should I use to clean my wound?     Cotton-tipped applicators (Qtips )    White petroleum jelly (Vaseline ). Use a clean new container and use Q-tips to apply.    Bandaids   as needed    Gentle soap     How should I care for my wound long term?    Do not get your wound dirty    Keep up with wound care for one week or until the area is healed.    A small scab will form and fall off by itself when the area is completely healed. The area will be red and will become pink in color as it heals. Sun protection is very important for how your scar will turn out. Sunscreen with an SPF 30 or greater is recommended once the area is healed.    If you have stitches, stitches need to be removed in 14 days. You may return to our clinic for this or you may have it done locally at your doctor s office.    You should have some soreness but it should be mild and slowly go away over several days. Talk to your doctor about using tylenol for pain,    When should I call my doctor?  If you have increased:     Pain or swelling    Pus or drainage (clear or slightly yellow drainage is ok)    Temperature over 100F    Spreading redness or warmth around wound    When will I hear about my results?  The biopsy results can take 2-3 weeks to come back. The clinic will call you with the results, send you a [employer] message, or have you schedule a follow-up clinic or phone time to discuss the results. Contact our clinics if you do not hear from us in 3 weeks.     Who should I call with questions?    [facility]: [phone]     [facility]: [phone]    For urgent needs outside of business hours call the [facility] at [phone] and ask for the dermatology resident on call            Follow-ups after your visit        Follow-up notes from your care team     Return in about 2 weeks (around 6/29/2017).      Your next 10 appointments  already scheduled     Kirill 15, 2017 10:05 AM CDT   (Arrive by 9:50 AM)   XR CHEST 2 VIEWS with UCXR1   [facility] ([facility] [place])    [address]  1st Floor  [facility] [address] [phone]           Please bring a list of your current medicines to your exam. (Include vitamins, minerals and over-thecounter medicines.) Leave your valuables at home.  Tell your doctor if there is a chance you may be pregnant.  You do not need to do anything special for this exam.            Jun 29, 2017 11:00 AM CDT   (Arrive by 10:45 AM)   Return Visit with Alisa Elizabeth MD   [facility] ([facility])    [address]  [address] [facility] [address] [phone]              Future tests that were ordered for you today     Open Future Orders        Priority Expected Expires Ordered    XR Chest 2 Views Routine 6/15/2017 6/15/2018 6/15/2017            Who to contact     Please call your clinic at [phone] to:    Ask questions about your health    Make or cancel appointments    Discuss your medicines    Learn about your test results    Speak to your doctor   If you have compliments or concerns about an experience at your clinic, or if you wish to file a complaint, please contact [facility] Patient Relations at [phone] or email us at [email or web address]         Additional Information About Your Visit        [employer] Information     [employer] gives you secure access to your electronic health record. If you see a primary care provider, you can also send messages to your care team and make appointments. If you have questions, please call your primary care clinic.  If you do not have a primary care provider, please call [phone] and they will assist you.      [employer] is an electronic gateway that provides easy, online access to your medical records. With [employer], you can request a clinic  appointment, read your test results, renew a prescription or communicate with your care team.     To access your existing account, please contact your [facility] or call [phone] for assistance.        Care EveryWhere ID     This is your Care EveryWhere ID. This could be used by other organizations to access your [place] medical records  [id number]         Blood Pressure from Last 3 Encounters:   05/03/17 117/74   03/04/17 115/83   03/31/16 110/70    Weight from Last 3 Encounters:   05/03/17 58.1 kg (128 lb)   03/04/17 57.2 kg (126 lb 2 oz)   03/31/16 59.4 kg (131 lb)              We Performed the Following     BIOPSY SKIN/SUBQ/MUC MEM, SINGLE LESION     Surgical pathology exam          Today's Medication Changes          These changes are accurate as of: 6/15/17  9:59 AM.  If you have any questions, ask your nurse or doctor.               Start taking these medicines.        Dose/Directions    gabapentin 300 MG capsule   Commonly known as:  NEURONTIN   Used for:  Pruritus   Started by:  Alisa Elizabeth MD        Dose:  300 mg   Take 1 capsule (300 mg) by mouth At Bedtime   Quantity:  90 capsule   Refills:  1       lidocaine 1% with EPINEPHrine 1:100,000 1 %-1:323802 injection   Used for:  Rash   Started by:  Alisa Elizabeth MD        Dose:  3 mL   Inject 3 mLs into the skin once for 1 dose   Quantity:  3 mL   Refills:  0       tretinoin 0.025 % cream   Commonly known as:  RETIN-A   Used for:  Other acne   Started by:  Alisa Elizabeth MD        Apply pea-sized amount to face at bedtime. Start every other night for 1-2 weeks, then increase to nightly as tolerated.   Quantity:  45 g   Refills:  11         Stop taking these medicines if you haven't already. Please contact your care team if you have questions.     cetirizine 10 MG tablet   Commonly known as:  zyrTEC   Stopped by:  Alisa Elizabeth MD           doxepin 50 MG capsule   Commonly known as:  SINEquan   Stopped  by:  Alisa Elizabeth MD           HYDROXYZINE HCL PO   Stopped by:  Alisa Elizabeth MD           NO ACTIVE MEDICATIONS   Stopped by:  Alisa Elizabeth MD           permethrin 5 % cream   Commonly known as:  ELIMITE   Stopped by:  Alisa Elizabeth MD           sulfamethoxazole-trimethoprim suspension   Commonly known as:  BACTRIM/SEPTRA   Stopped by:  Alisa Elizabeth MD           TRAMADOL HCL PO   Stopped by:  Alisa Elizabeth MD                Where to get your medicines      These medications were sent to [id number] - [place], [place] - [address]  [address], [place] [address]     Phone:  [phone]     gabapentin 300 MG capsule    tretinoin 0.025 % cream         Some of these will need a paper prescription and others can be bought over the counter.  Ask your nurse if you have questions.     You don't need a prescription for these medications     lidocaine 1% with EPINEPHrine 1:100,000 1 %-1:275136 injection                Primary Care Provider Office Phone # Fax #    Marcia Erin Edmond [phone]       [facility] [address] [place] [address]        Thank you!     Thank you for choosing [facility] DERMATOLOGY  for your care. Our goal is always to provide you with excellent care. Hearing back from our patients is one way we can continue to improve our services. Please take a few minutes to complete the written survey that you may receive in the mail after your visit with us. Thank you!             Your Updated Medication List - Protect others around you: Learn how to safely use, store and throw away your medicines at www.disposemymeds.org.          This list is accurate as of: 6/15/17  9:59 AM.  Always use your most recent med list.                   Brand Name Dispense Instructions for use    AIRBORNE GUMMIES Chew      Take by mouth daily       gabapentin 300 MG capsule    NEURONTIN    90 capsule    Take 1 capsule (300 mg) by mouth At  Bedtime       IBUPROFEN PO      Reported on 3/4/2017       lidocaine 1% with EPINEPHrine 1:100,000 1 %-1:379513 injection     3 mL    Inject 3 mLs into the skin once for 1 dose       tretinoin 0.025 % cream    RETIN-A    45 g    Apply pea-sized amount to face at bedtime. Start every other night for 1-2 weeks, then increase to nightly as tolerated.

## 2017-06-15 NOTE — LETTER
6/15/2017       RE: Mira Mariee  [address]  [facility] [address]     Dear Colleague,    Thank you for referring your patient, Mira Mariee, to the [facility] DERMATOLOGY at [facility]. Please see a copy of my visit note below.    [facility] Dermatology Note      Dermatology Problem List:  1.  Papular eruption with occasional pustule and extreme pruritus. Unclear etiology - ddx papular eczema, folliculitis, persistent arthropod.  - Bx 6/15/2017.  - Gentle skin cares pending bx results.  - Gabapentin 300 mg qHS.  - Prior eval: CBC, CMP, TSH, SPEP 5/2017 wnl. CXR 6/15/2017 wnl.  2. Comedonal acne. Rx. Tretinoin 0.025% cream.    Encounter Date: Kirill 15, 2017    CC:   Chief Complaint   Patient presents with     Derm Problem     Itchy rash all over body. Mira is currently only using Tea tree oil and an antibacterial spray.         History of Present Illness:  Ms. Mira Mariee is a 26 year old female who presents as a referral from Dr. Mark for evaluation of rash and pruritus. Patient first noted rash and itching about 1 year ago. Since that time it worsened, then improved, and now about the staying about the same. Skin feels itchy everywhere. Bumps most bothersome on face and upper arms. Sometimes bumps are there first and then she itches but other times she itches and then a bump appears. Able to express white material out of some bumps. Has attributed this to infections and has tried bug bombs, lice treatments, and mite creams. Was washing bedding every day for a while but now washing every 2-3 days. Uses Dial soap to prevent infection and Marilou lotion. Tried anti-itch creams which help a little. No triggers but skin hurts in sun. Lives with daughter and roommate who are itching. Took daughter to doctor and they said she had eczema. Patient takes no medications currently. Tried some anti-histamines but these decreased itching from  "once every 1 min to once every 2 minutes and so were not worth taking a pill every day and so she stopped.    These symptoms have been severely affecting her life. She is only sleeping a few hours at night and has starting drinking alcohol to help sleep. Lost weight after feeling sick all winter. Didn't drink any caffeine for 2-3 times while feeling so sick and no change in her symptoms. Works in [employer].    Previously saw Dr. Mark and biopsy obtained which showed dermal fibrosis.    Past Medical History:   Otherwise healthy.    Social History:  The patient works in [employer]. Has a 5 year old daughter.     Family History:  Mom with \"bad skin\" - rosacea, eczema.    Medications:  Current Outpatient Prescriptions   Medication Sig Dispense Refill     Multiple Vitamins-Minerals (AIRBORNE GUMMIES) CHEW Take by mouth daily       tretinoin (RETIN-A) 0.025 % cream Apply pea-sized amount to face at bedtime. Start every other night for 1-2 weeks, then increase to nightly as tolerated. 45 g 11     gabapentin (NEURONTIN) 300 MG capsule Take 1 capsule (300 mg) by mouth At Bedtime 90 capsule 1     lidocaine 1% with EPINEPHrine 1:100,000 1 %-1:155921 injection Inject 3 mLs into the skin once for 1 dose 3 mL 0     IBUPROFEN PO Reported on 3/4/2017          Allergies   Allergen Reactions     Advair [Advair Diskus]      Made chest tightness worse     Animal Dander      Nuts      Trees          Review of Systems:  -Constitutional: Poor sleep and weight loss as above.  -Skin: As above in HPI. No additional skin concerns.    Physical exam:  GEN: This is a well developed, well-nourished female in no acute distress. Occasionally tearful.  SKIN: Total skin excluding the undergarment areas was performed. The exam included the head/face, neck, both arms, chest, back, abdomen, both legs, digits and/or nails.   - Many comedones on forehead.  - A single pustule present on central upper back.  - Biopsy scar well-healed on upper back.  - " Scattered pink papules and hyperpigmented macules on upper back, upper chest, shoulders, upper arms, and left lateral wrist.  - No other lesions of concern on areas examined.     Impression/Plan:  1. Papular eruption with occasional pustule and extreme pruritus. Unclear etiology - ddx papular eczema, folliculitis, persistent arthropod. Has evidence of acne on exam as below but would not expect this to cause some extreme pruritus. Baseline labs 5/2017 including CBC, CMP, TSH, SPEP reassuring. May be dealing with two separate issues - underlying neurogenic pruritus and acne/folliculitus. Will obtain biopsy for clarification.    Punch biopsy:  After discussion of benefits and risks including but not limited to bleeding/bruising, pain/swelling, infection, scar, incomplete removal, nerve damage/numbness, recurrence, and non-diagnostic biopsy, written consent, verbal consent and photographs were obtained. Time-out was performed. The area was cleaned with isopropyl alcohol. 1.5 mL of 1% lidocaine with epinephrine was injected to obtain adequate anesthesia of the lesion on the right shoulder. A 4 mm punch biopsy was performed.  4-0 prolene sutures were utilized to approximate the epidermal edges.  White petroleum jelly/VaselineTM and a bandage was applied to the wound.  Explicit verbal and written wound care instructions were provided.  The patient left the Dermatology Clinic in good condition. The patient was counseled to follow up for suture removal in approximately 14 days.    Gentle skin cares advised.    Start gabapentin 300 mg qHS. Advised on anticipated side effect of sedation.    CXR today wnl.    Further recommendations to follow pending biopsy results.    2. Comedonal acne.    Start tretinoin 0.025% cream qHS. Counseled on side effects of xerosis. Recommended to start every other night, then increase to nightly as tolerated. Follow with non-comedogenic moisturizer.    CC Dr. Mark on close of this  encounter.  Follow-up in 2 weeks, sooner if concerns.      Dr. Claudia Linton staffed the patient.    Staff Involved:  Resident(Alisa Elizabeth)/Staff(as above)    I have personally examined this patient and agree with Dr. Elizabeth*'s documentation and plan of care. I have reviewed and amended the resident's note above. The documentation accurately reflects my clinical observations, diagnoses, treatment and follow-up plans.I was present for key portions of the procedure.        Claudia Linton MD  Dermatology Staff      Pictures were placed in Pt's chart today for future reference.

## 2017-06-16 NOTE — PROGRESS NOTES
[facility] Dermatology Note      Dermatology Problem List:  1.  Papular eruption with occasional pustule and extreme pruritus. Unclear etiology - ddx papular eczema, folliculitis, persistent arthropod.  - Bx 6/15/2017.  - Gentle skin cares pending bx results.  - Gabapentin 300 mg qHS.  - Prior eval: CBC, CMP, TSH, SPEP 5/2017 wnl. CXR 6/15/2017 wnl.  2. Comedonal acne. Rx. Tretinoin 0.025% cream.    Encounter Date: Kirill 15, 2017    CC:   Chief Complaint   Patient presents with     Derm Problem     Itchy rash all over body. Mira is currently only using Tea tree oil and an antibacterial spray.         History of Present Illness:  Ms. Mira Mariee is a 26 year old female who presents as a referral from Dr. Mark for evaluation of rash and pruritus. Patient first noted rash and itching about 1 year ago. Since that time it worsened, then improved, and now about the staying about the same. Skin feels itchy everywhere. Bumps most bothersome on face and upper arms. Sometimes bumps are there first and then she itches but other times she itches and then a bump appears. Able to express white material out of some bumps. Has attributed this to infections and has tried bug bombs, lice treatments, and mite creams. Was washing bedding every day for a while but now washing every 2-3 days. Uses Dial soap to prevent infection and Marilou lotion. Tried anti-itch creams which help a little. No triggers but skin hurts in sun. Lives with daughter and roommate who are itching. Took daughter to doctor and they said she had eczema. Patient takes no medications currently. Tried some anti-histamines but these decreased itching from once every 1 min to once every 2 minutes and so were not worth taking a pill every day and so she stopped.    These symptoms have been severely affecting her life. She is only sleeping a few hours at night and has starting drinking alcohol to help sleep. Lost weight after feeling sick  "all winter. Didn't drink any caffeine for 2-3 times while feeling so sick and no change in her symptoms. Works in [employer].    Previously saw Dr. Mark and biopsy obtained which showed dermal fibrosis.    Past Medical History:   Otherwise healthy.    Social History:  The patient works in [employer]. Has a 5 year old daughter.     Family History:  Mom with \"bad skin\" - rosacea, eczema.    Medications:  Current Outpatient Prescriptions   Medication Sig Dispense Refill     Multiple Vitamins-Minerals (AIRBORNE GUMMIES) CHEW Take by mouth daily       tretinoin (RETIN-A) 0.025 % cream Apply pea-sized amount to face at bedtime. Start every other night for 1-2 weeks, then increase to nightly as tolerated. 45 g 11     gabapentin (NEURONTIN) 300 MG capsule Take 1 capsule (300 mg) by mouth At Bedtime 90 capsule 1     lidocaine 1% with EPINEPHrine 1:100,000 1 %-1:[id number] injection Inject 3 mLs into the skin once for 1 dose 3 mL 0     IBUPROFEN PO Reported on 3/4/2017          Allergies   Allergen Reactions     Advair [Advair Diskus]      Made chest tightness worse     Animal Dander      Nuts      Trees          Review of Systems:  -Constitutional: Poor sleep and weight loss as above.  -Skin: As above in HPI. No additional skin concerns.    Physical exam:  GEN: This is a well developed, well-nourished female in no acute distress. Occasionally tearful.  SKIN: Total skin excluding the undergarment areas was performed. The exam included the head/face, neck, both arms, chest, back, abdomen, both legs, digits and/or nails.   - Many comedones on forehead.  - A single pustule present on central upper back.  - Biopsy scar well-healed on upper back.  - Scattered pink papules and hyperpigmented macules on upper back, upper chest, shoulders, upper arms, and left lateral wrist.  - No other lesions of concern on areas examined.     Impression/Plan:  1. Papular eruption with occasional pustule and extreme pruritus. Unclear etiology - ddx " papular eczema, folliculitis, persistent arthropod. Has evidence of acne on exam as below but would not expect this to cause some extreme pruritus. Baseline labs 5/2017 including CBC, CMP, TSH, SPEP reassuring. May be dealing with two separate issues - underlying neurogenic pruritus and acne/folliculitus. Will obtain biopsy for clarification.    Punch biopsy:  After discussion of benefits and risks including but not limited to bleeding/bruising, pain/swelling, infection, scar, incomplete removal, nerve damage/numbness, recurrence, and non-diagnostic biopsy, written consent, verbal consent and photographs were obtained. Time-out was performed. The area was cleaned with isopropyl alcohol. 1.5 mL of 1% lidocaine with epinephrine was injected to obtain adequate anesthesia of the lesion on the right shoulder. A 4 mm punch biopsy was performed.  4-0 prolene sutures were utilized to approximate the epidermal edges.  White petroleum jelly/VaselineTM and a bandage was applied to the wound.  Explicit verbal and written wound care instructions were provided.  The patient left the Dermatology Clinic in good condition. The patient was counseled to follow up for suture removal in approximately 14 days.    Gentle skin cares advised.    Start gabapentin 300 mg qHS. Advised on anticipated side effect of sedation.    CXR today wnl.    Further recommendations to follow pending biopsy results.    2. Comedonal acne.    Start tretinoin 0.025% cream qHS. Counseled on side effects of xerosis. Recommended to start every other night, then increase to nightly as tolerated. Follow with non-comedogenic moisturizer.    CC Dr. Mark on close of this encounter.  Follow-up in 2 weeks, sooner if concerns.      Dr. Claudia Linton staffed the patient.    Staff Involved:  Resident(Alisa Elizabeth)/Staff(as above)    I have personally examined this patient and agree with Dr. Elizabeth*'s documentation and plan of care. I have reviewed and amended the  resident's note above. The documentation accurately reflects my clinical observations, diagnoses, treatment and follow-up plans.I was present for key portions of the procedure.        Claudia Linton MD  Dermatology Staff

## 2017-06-17 PROBLEM — L29.9 PRURITUS: Status: ACTIVE | Noted: 2017-06-17

## 2017-06-17 PROBLEM — R21 RASH: Status: ACTIVE | Noted: 2017-06-17

## 2017-06-17 PROBLEM — L70.8 OTHER ACNE: Status: ACTIVE | Noted: 2017-06-17

## 2017-06-17 LAB — COPATH REPORT: NORMAL

## 2017-06-19 ENCOUNTER — TELEPHONE (OUTPATIENT)
Dept: DERMATOLOGY | Facility: CLINIC | Age: 27
End: 2017-06-19

## 2017-06-19 DIAGNOSIS — L29.9 PRURITUS: Primary | ICD-10-CM

## 2017-06-19 RX ORDER — GABAPENTIN 100 MG/1
100 CAPSULE ORAL AT BEDTIME
Qty: 30 CAPSULE | Refills: 1 | Status: SHIPPED | OUTPATIENT
Start: 2017-06-19 | End: 2017-10-31

## 2017-06-19 NOTE — TELEPHONE ENCOUNTER
Spoke with patient. Will decrease dose to 100  Mg qhs. Discussed that if she continues to experience those symptoms would plan to discontinue gabapentin. If she tolerates this dose, could consider increasing to 200 mg qhs as tolerated.     Maritza Tracy MD  PGY-2, Dermatology

## 2017-06-19 NOTE — TELEPHONE ENCOUNTER
"Patient states she started taking gabapentin as prescribed on 6/15 and took before bed at 10:00 PM. When woke up at 8:00 AM felt \"different\" had to drive for 2 hours and an hour into drive started to feel \"dizzy, tired, out of it\". States when making it to destination she took a 3 hour nap when she woke up she felt better but still felt \"slow\" She has not taken medication since. Is requesting a lower dose of this medication as she feels it is beneficial for her to take because the itching went away. Will route to provider for further recommendations.  "

## 2017-06-22 ENCOUNTER — TELEPHONE (OUTPATIENT)
Dept: DERMATOLOGY | Facility: CLINIC | Age: 27
End: 2017-06-22

## 2017-06-22 DIAGNOSIS — L73.9 FOLLICULITIS: Primary | ICD-10-CM

## 2017-06-22 RX ORDER — KETOCONAZOLE 20 MG/ML
SHAMPOO TOPICAL
Qty: 120 ML | Refills: 11 | Status: SHIPPED | OUTPATIENT
Start: 2017-06-22 | End: 2017-10-31

## 2017-06-22 NOTE — TELEPHONE ENCOUNTER
Notified patient of biopsy results. Discussed natural history and etiology of folliculitis. Advised plan as below. Will also send patient mychart message.     # Folliculitis, possibly bacterial +/- Pityrosporum.  - Start ketoconazole shampoo 3 times per week. Counseled to massage into wet skin, let sit 5 min, then rinse.  - Start benzoyl peroxide 5% wash every other day to daily in shower.   - Okay to continue gabapentin 100 mg qHS if tolerating. Can also use Benadryl for nighttime pruritus.    RTC in 6-8 weeks.

## 2017-07-29 ENCOUNTER — RADIANT APPOINTMENT (OUTPATIENT)
Dept: GENERAL RADIOLOGY | Facility: CLINIC | Age: 27
End: 2017-07-29
Attending: FAMILY MEDICINE
Payer: COMMERCIAL

## 2017-07-29 ENCOUNTER — OFFICE VISIT (OUTPATIENT)
Dept: URGENT CARE | Facility: URGENT CARE | Age: 27
End: 2017-07-29
Payer: COMMERCIAL

## 2017-07-29 VITALS
DIASTOLIC BLOOD PRESSURE: 77 MMHG | HEART RATE: 77 BPM | BODY MASS INDEX: 24.27 KG/M2 | WEIGHT: 137 LBS | SYSTOLIC BLOOD PRESSURE: 122 MMHG | TEMPERATURE: 99.7 F

## 2017-07-29 DIAGNOSIS — R07.9 CHEST PAIN, UNSPECIFIED TYPE: ICD-10-CM

## 2017-07-29 DIAGNOSIS — R20.2 NUMBNESS AND TINGLING OF RIGHT ARM: ICD-10-CM

## 2017-07-29 DIAGNOSIS — R20.0 NUMBNESS AND TINGLING OF RIGHT ARM: ICD-10-CM

## 2017-07-29 DIAGNOSIS — R07.9 CHEST PAIN, UNSPECIFIED TYPE: Primary | ICD-10-CM

## 2017-07-29 LAB
D DIMER PPP FEU-MCNC: 0.2 UG/ML FEU (ref 0–0.5)
TROPONIN I SERPL-MCNC: NORMAL UG/L (ref 0–0.04)
WBC # BLD AUTO: 10.5 10E9/L (ref 4–11)

## 2017-07-29 PROCEDURE — 36415 COLL VENOUS BLD VENIPUNCTURE: CPT | Performed by: FAMILY MEDICINE

## 2017-07-29 PROCEDURE — 85048 AUTOMATED LEUKOCYTE COUNT: CPT | Performed by: FAMILY MEDICINE

## 2017-07-29 PROCEDURE — 99215 OFFICE O/P EST HI 40 MIN: CPT | Performed by: FAMILY MEDICINE

## 2017-07-29 PROCEDURE — 84484 ASSAY OF TROPONIN QUANT: CPT | Performed by: FAMILY MEDICINE

## 2017-07-29 PROCEDURE — 71020 XR CHEST 2 VW: CPT

## 2017-07-29 PROCEDURE — 85379 FIBRIN DEGRADATION QUANT: CPT | Performed by: FAMILY MEDICINE

## 2017-07-29 PROCEDURE — 93000 ELECTROCARDIOGRAM COMPLETE: CPT | Performed by: FAMILY MEDICINE

## 2017-07-29 RX ORDER — NAPROXEN 500 MG/1
500 TABLET ORAL 2 TIMES DAILY PRN
Qty: 14 TABLET | Refills: 0 | Status: SHIPPED | OUTPATIENT
Start: 2017-07-29 | End: 2017-08-05

## 2017-07-29 RX ORDER — LORATADINE 10 MG/1
10 TABLET ORAL DAILY
COMMUNITY

## 2017-07-29 NOTE — PROGRESS NOTES
SUBJECTIVE: Mira Mariee is a 26 year old female presenting with a chief complaint of Chest pain and rt arm numbness/tingling.  Onset of symptoms was month(s) ago, off/on.  Course of illness is improving.    Severity moderate  Current and Associated symptoms: none  Treatment measures tried include None tried.  Predisposing factors include None.    History reviewed. No pertinent past medical history.    History reviewed. No pertinent surgical history.    Family History   Problem Relation Age of Onset     DIABETES Mother      Hypertension Mother      Coronary Artery Disease No family hx of      Breast Cancer No family hx of      Colon Cancer No family hx of      Prostate Cancer No family hx of      Other Cancer No family hx of      Melanoma No family hx of      Skin Cancer No family hx of        Social History   Substance Use Topics     Smoking status: Current Every Day Smoker     Packs/day: 1.00     Years: 8.00     Types: Cigarettes     Smokeless tobacco: Never Used      Comment: 1 pack/day and E cig     Alcohol use No     Review Of Systems  Skin: negative  Eyes: negative  Ears/Nose/Throat: negative  Respiratory: No shortness of breath, dyspnea on exertion, cough, or hemoptysis  Cardiovascular: as above  Gastrointestinal: negative  Genitourinary: negative  Musculoskeletal: negative  Neurologic: negative  Psychiatric: negative  Hematologic/Lymphatic/Immunologic: negative  Endocrine: negative      OBJECTIVE:  /77 (BP Location: Left arm, Patient Position: Chair, Cuff Size: Adult Regular)  Pulse 77  Temp 99.7  F (37.6  C) (Oral)  Wt 137 lb (62.1 kg)  LMP 07/01/2017  BMI 24.27 kg/m2   GENERAL APPEARANCE: healthy, alert and no distress  EYES: EOMI,  PERRL, conjunctiva clear  HENT: ear canals and TM's normal.  Nose and mouth without ulcers, erythema or lesions  NECK: supple, nontender, no lymphadenopathy  RESP: lungs clear to auscultation - no rales, rhonchi or wheezes  CV: regular rates and rhythm,  normal S1 S2, no murmur noted  ABDOMEN:  soft, nontender, no HSM or masses and bowel sounds normal  NEURO: Normal strength and tone, sensory exam grossly normal,  normal speech and mentation  SKIN: no suspicious lesions or rashes    Xray without acute findings, read by Layo Edmond D.O.         EKG Interpretation:      Interpreted by Laoy Edmond    Symptoms at time of EKG: as above  Rhythm: Normal sinus   Rate: Normal  Axis: Normal  Ectopy: None  Conduction: Normal  ST Segments/ T Waves: No ST-T wave changes and No acute ischemic changes  Q Waves: None  Comparison to prior: No old EKG available    Clinical Impression: normal EKG        ICD-10-CM    1. Chest pain, unspecified type R07.9 EKG 12-lead complete w/read - Clinics     Troponin I     D dimer quantitative     WBC count     XR Chest 2 Views     naproxen (NAPROSYN) 500 MG tablet   2. Numbness and tingling of right arm R20.0 Troponin I    R20.2 D dimer quantitative     WBC count     XR Chest 2 Views     naproxen (NAPROSYN) 500 MG tablet     F/u PCP recheck, ED if worse.

## 2017-07-29 NOTE — MR AVS SNAPSHOT
After Visit Summary   7/29/2017    Mira Mariee    MRN: [id number]           Patient Information     Date Of Birth          1990        Visit Information        Provider Department      7/29/2017 1:40 PM Layo Edmond DO [facility]        Today's Diagnoses     Chest pain, unspecified type    -  1    Numbness and tingling of right arm           Follow-ups after your visit        Who to contact     If you have questions or need follow up information about today's clinic visit or your schedule please contact [facility] directly at [phone].  Normal or non-critical lab and imaging results will be communicated to you by Markrhart, letter or phone within 4 business days after the clinic has received the results. If you do not hear from us within 7 days, please contact the clinic through Markrhart or phone. If you have a critical or abnormal lab result, we will notify you by phone as soon as possible.  Submit refill requests through Algisys or call your pharmacy and they will forward the refill request to us. Please allow 3 business days for your refill to be completed.          Additional Information About Your Visit        MyChart Information     Algisys gives you secure access to your electronic health record. If you see a primary care provider, you can also send messages to your care team and make appointments. If you have questions, please call your primary care clinic.  If you do not have a primary care provider, please call [phone] and they will assist you.        Care EveryWhere ID     This is your Care EveryWhere ID. This could be used by other organizations to access your [facility] medical records  [id number]        Your Vitals Were     Pulse Temperature Last Period BMI (Body Mass Index)          77 99.7  F (37.6  C) (Oral) 07/01/2017 24.27 kg/m2         Blood Pressure from Last 3 Encounters:   07/29/17 122/77    05/03/17 117/74   03/04/17 115/83    Weight from Last 3 Encounters:   07/29/17 137 lb (62.1 kg)   05/03/17 128 lb (58.1 kg)   03/04/17 126 lb 2 oz (57.2 kg)              We Performed the Following     D dimer quantitative     EKG 12-lead complete w/read - Clinics     Troponin I     WBC count          Today's Medication Changes          These changes are accurate as of: 7/29/17  3:04 PM.  If you have any questions, ask your nurse or doctor.               Start taking these medicines.        Dose/Directions    naproxen 500 MG tablet   Commonly known as:  NAPROSYN   Used for:  Chest pain, unspecified type, Numbness and tingling of right arm   Started by:  Layo Edmond,         Dose:  500 mg   Take 1 tablet (500 mg) by mouth 2 times daily as needed for moderate pain   Quantity:  14 tablet   Refills:  0            Where to get your medicines      These medications were sent to [facility] [address] [place] [address] [address] [facility]  [address], [facility] [address]    Hours:  24-hours Phone:  [phone]     naproxen 500 MG tablet                Primary Care Provider Office Phone # Fax #    Marcia Erin Edmond [phone]       [facility] [address] [place] [address]        Equal Access to Services     DONAL [facility] Center[facility] AH: Hadii aad ku hadasho Soomaali, waaxda luqadaha, qaybta kaalmada adeegyada, shadi chamberlainin haychar matute . So [facility] [phone].    ATENCIÓN: Si habla español, tiene a jaime disposición servicios gratuitos de asistencia lingüística. Llame al [phone].    We comply with applicable federal civil rights laws and [place] laws. We do not discriminate on the basis of race, color, national origin, age, disability sex, sexual orientation or gender identity.            Thank you!     Thank you for choosing [facility]  for your care. Our goal is always to provide you with excellent care.  Hearing back from our patients is one way we can continue to improve our services. Please take a few minutes to complete the written survey that you may receive in the mail after your visit with us. Thank you!             Your Updated Medication List - Protect others around you: Learn how to safely use, store and throw away your medicines at www.disposemymeds.org.          This list is accurate as of: 7/29/17  3:04 PM.  Always use your most recent med list.                   Brand Name Dispense Instructions for use Diagnosis    AIRBORNE GUMMIES Chew      Take by mouth daily        gabapentin 100 MG capsule    NEURONTIN    30 capsule    Take 1 capsule (100 mg) by mouth At Bedtime    Pruritus       IBUPROFEN PO      Reported on 3/4/2017        ketoconazole 2 % shampoo    NIZORAL    120 mL    Massage onto wet skin, let sit 3-5 min in the shower, then rinse. Do this at least 3x weekly.    Folliculitis       loratadine 10 MG tablet    CLARITIN     Take 10 mg by mouth daily        naproxen 500 MG tablet    NAPROSYN    14 tablet    Take 1 tablet (500 mg) by mouth 2 times daily as needed for moderate pain    Chest pain, unspecified type, Numbness and tingling of right arm       tretinoin 0.025 % cream    RETIN-A    45 g    Apply pea-sized amount to face at bedtime. Start every other night for 1-2 weeks, then increase to nightly as tolerated.    Other acne

## 2017-08-12 ENCOUNTER — HEALTH MAINTENANCE LETTER (OUTPATIENT)
Age: 27
End: 2017-08-12

## 2017-10-31 ENCOUNTER — OFFICE VISIT (OUTPATIENT)
Dept: DERMATOLOGY | Facility: CLINIC | Age: 27
End: 2017-10-31

## 2017-10-31 DIAGNOSIS — L70.0 ACNE VULGARIS: Primary | ICD-10-CM

## 2017-10-31 DIAGNOSIS — L73.9 FOLLICULITIS: ICD-10-CM

## 2017-10-31 RX ORDER — SPIRONOLACTONE 50 MG/1
50 TABLET, FILM COATED ORAL 2 TIMES DAILY
Qty: 180 TABLET | Refills: 3 | Status: SHIPPED | OUTPATIENT
Start: 2017-10-31 | End: 2018-04-17

## 2017-10-31 RX ORDER — KETOCONAZOLE 20 MG/ML
SHAMPOO TOPICAL
Qty: 120 ML | Refills: 11 | Status: SHIPPED | OUTPATIENT
Start: 2017-10-31

## 2017-10-31 ASSESSMENT — PAIN SCALES - GENERAL: PAINLEVEL: NO PAIN (0)
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
## 2017-10-31 NOTE — MR AVS SNAPSHOT
After Visit Summary   10/31/2017    Mira Mariee    MRN: [id number]           Patient Information     Date Of Birth          1990        Visit Information        Provider Department      10/31/2017 9:45 AM Jacobo Schmid MD [facility]        Today's Diagnoses     Acne vulgaris    -  1    Folliculitis           Follow-ups after your visit        Your next 10 appointments already scheduled     Jan 25, 2018 11:00 AM CST   (Arrive by 10:45 AM)   Return Visit with MD RICHARD Dockery [facility] ([facility])    [address] [place] [address] [id number] [phone]              Who to contact     Please call your clinic at [phone] to:    Ask questions about your health    Make or cancel appointments    Discuss your medicines    Learn about your test results    Speak to your doctor   If you have compliments or concerns about an experience at your clinic, or if you wish to file a complaint, please contact [facility] Patient Relations at [phone] or email us at [email or web address]         Additional Information About Your Visit        MyChart Information     [employer] gives you secure access to your electronic health record. If you see a primary care provider, you can also send messages to your care team and make appointments. If you have questions, please call your primary care clinic.  If you do not have a primary care provider, please call [phone] and they will assist you.      [employer] is an electronic gateway that provides easy, online access to your medical records. With [employer], you can request a clinic appointment, read your test results, renew a prescription or communicate with your care team.     To access your existing account, please contact your [facility] Clinic or call [phone] for assistance.        Care EveryWhere ID     This is your  Care EveryWhere ID. This could be used by other organizations to access your [facility] records  [id number]         Blood Pressure from Last 3 Encounters:   07/29/17 122/77   05/03/17 117/74   03/04/17 115/83    Weight from Last 3 Encounters:   07/29/17 62.1 kg (137 lb)   05/03/17 58.1 kg (128 lb)   03/04/17 57.2 kg (126 lb 2 oz)              Today, you had the following     No orders found for display         Today's Medication Changes          These changes are accurate as of: 10/31/17 11:59 PM.  If you have any questions, ask your nurse or doctor.               Start taking these medicines.        Dose/Directions    spironolactone 50 MG tablet   Commonly known as:  ALDACTONE   Used for:  Acne vulgaris   Started by:  Jacobo Schmid MD        Dose:  50 mg   Take 1 tablet (50 mg) by mouth 2 times daily   Quantity:  180 tablet   Refills:  3         Stop taking these medicines if you haven't already. Please contact your care team if you have questions.     gabapentin 100 MG capsule   Commonly known as:  NEURONTIN   Stopped by:  Jacobo Schmid MD                Where to get your medicines      These medications were sent to [id number] - [place], [place] - [address] [facility]  [address] [place] [id number]     Phone:  [phone]     ketoconazole 2 % shampoo    spironolactone 50 MG tablet                Primary Care Provider Office Phone # Fax #    Marcia Erin Edmond [phone]       [facility] [address] [place] [address]        Equal Access to Services     DONAL GONZALEZ AH: Hadii bryant ku hadasho Soomaali, waaxda luqadaha, qaybta kaalmada adeegyajosé, shadi calderon. So [facility] [phone].    ATENCIÓN: Si habla español, tiene a jaime disposición servicios gratuitos de asistencia lingüística. Llame al [phone].    We comply with applicable federal civil rights laws and [place] laws. We do not discriminate on the basis of race,  color, national origin, age, disability, sex, sexual orientation, or gender identity.            Thank you!     Thank you for choosing [facility] DERMATOLOGY  for your care. Our goal is always to provide you with excellent care. Hearing back from our patients is one way we can continue to improve our services. Please take a few minutes to complete the written survey that you may receive in the mail after your visit with us. Thank you!             Your Updated Medication List - Protect others around you: Learn how to safely use, store and throw away your medicines at www.disposemymeds.org.          This list is accurate as of: 10/31/17 11:59 PM.  Always use your most recent med list.                   Brand Name Dispense Instructions for use Diagnosis    AIRBORNE GUMMIES Chew      Take by mouth daily        IBUPROFEN PO      Reported on 3/4/2017        ketoconazole 2 % shampoo    NIZORAL    120 mL    Massage onto wet skin, let sit 3-5 min in the shower, then rinse. Do this at least 3x weekly.    Folliculitis       loratadine 10 MG tablet    CLARITIN     Take 10 mg by mouth daily        spironolactone 50 MG tablet    ALDACTONE    180 tablet    Take 1 tablet (50 mg) by mouth 2 times daily    Acne vulgaris       tretinoin 0.025 % cream    RETIN-A    45 g    Apply pea-sized amount to face at bedtime. Start every other night for 1-2 weeks, then increase to nightly as tolerated.    Other acne

## 2017-10-31 NOTE — NURSING NOTE
Dermatology Rooming Note    Mira Mariee's goals for this visit include:   Chief Complaint   Patient presents with     Derm Problem     Folliculitis. Mira notes improvement from her last visit, but her skin is not clear.     Aleyda Kuhn, CMA

## 2017-11-07 PROBLEM — L70.0 ACNE VULGARIS: Status: ACTIVE | Noted: 2017-11-07

## 2018-01-25 ENCOUNTER — OFFICE VISIT (OUTPATIENT)
Dept: DERMATOLOGY | Facility: CLINIC | Age: 28
End: 2018-01-25
Payer: COMMERCIAL

## 2018-01-25 DIAGNOSIS — L68.0 HIRSUTISM: Primary | ICD-10-CM

## 2018-01-25 ASSESSMENT — PAIN SCALES - GENERAL: PAINLEVEL: NO PAIN (0)

## 2018-01-25 NOTE — NURSING NOTE
"Dermatology Rooming Note    Mira Mariee's goals for this visit include:   Chief Complaint   Patient presents with     Derm Problem     Folliculitis on face and back. Mira notes that she is having a \"horrible break out right now.\"     Aleyda Kuhn, [facility]  "
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
## 2018-01-25 NOTE — MR AVS SNAPSHOT
"              After Visit Summary   1/25/2018    Mira Mariee    MRN: [id number]           Patient Information     Date Of Birth          1990        Visit Information        Provider Department      1/25/2018 11:00 AM Jacobo Schmid MD [facility] Dermatology        Care Instructions      For your folliculitis, start bleach baths 2-3 times weekly. The instructions are below. Heavy moisturizing cream.     For the hair growth, we will start the spirinolactone again at a lower dose half a pill (25mg) daily. After a week increase to one whole pill a day (50mg daily), then one pill twce a day (50mg twice daily) after one week if you are tolerating that well.  Start with       Bleach Bath Instructions  What are dilute bleach baths?  Dilute bleach baths are used to help fight bacteria that is commonly found on the skin; this bacteria may be preventing your skin from healing. If is also used to calm inflammation in skin, even if infection is not present. The dilution ratio we recommend is the same concentration that is in a swimming pool.     Type;  *Regular, plain household bleach used for cleaning clothing. Brand or Generic is okay.   *Make sure this is plain or concentrated bleach. This should NOT be \"splash free, splash less or color safe.\"   *There should not be any added fragrance to the bleach; such a lavender.    How do I make a dilute bleach bath?  *Fill your tub with lukewarm water with at least 4-6 inches of water.  *Pour 1/4 to 1/2 cup of bleach into an adult size bath tub.  *For smaller tubs (infant tubs), add two tablespoons of bleach to the tub water. * Bleach baths work better if your child is able to submerge most of their skin, so consider placing the infant tub in the larger tub.   *Repeat bleach baths as recommended by your provider.    Other information:  *Do not pour bleach directly onto the skin.  *If is safe to get the bleach mixture on your face and scalp.  *Do not drink the bleach " mixture.  *Keep bleach bottle out of reach of children.                  Follow-ups after your visit        Your next 10 appointments already scheduled     Apr 17, 2018  9:15 AM CDT   (Arrive by 9:00 AM)   Return Visit with Jacobo Schmid MD   [facility] Dermatology ([facility] [place])    [address] [id number] [phone]              Who to contact     Please call your clinic at [phone] to:    Ask questions about your health    Make or cancel appointments    Discuss your medicines    Learn about your test results    Speak to your doctor   If you have compliments or concerns about an experience at your clinic, or if you wish to file a complaint, please contact [facility] Patient Relations at [phone] or email us at [email or web address]         Additional Information About Your Visit        MyChart Information     [email or web address] gives you secure access to your electronic health record. If you see a primary care provider, you can also send messages to your care team and make appointments. If you have questions, please call your primary care clinic.  If you do not have a primary care provider, please call [phone] and they will assist you.      [employer] is an electronic gateway that provides easy, online access to your medical records. With [employer], you can request a clinic appointment, read your test results, renew a prescription or communicate with your care team.     To access your existing account, please contact your [facility] or call [phone] for assistance.        Care EveryWhere ID     This is your Care EveryWhere ID. This could be used by other organizations to access your [place] medical records  [id number]         Blood Pressure from Last 3 Encounters:   07/29/17 122/77   05/03/17 117/74   03/04/17 115/83    Weight from Last 3 Encounters:   07/29/17 62.1 kg (137 lb)    05/03/17 58.1 kg (128 lb)   03/04/17 57.2 kg (126 lb 2 oz)              Today, you had the following     No orders found for display       Primary Care Provider Office Phone # Fax #    Marcia Erin Edmond [phone]       [facility] [address] [place] [address]        Equal Access to Services     [facility] : Hadii aad ku hadasho Soomaali, waaxda luqadaha, qaybta kaalmada adeegyada, waxay idiin hayaan adeeg khkeilash laCoraluizn ah. So [facility] [phone].    ATENCIÓN: Si nelson gonzalez, tiene a jaime disposición servicios gratuitos de asistencia lingüística. Obdulia al [phone].    We comply with applicable federal civil rights laws and [place] laws. We do not discriminate on the basis of race, color, national origin, age, disability, sex, sexual orientation, or gender identity.            Thank you!     Thank you for choosing [facility] DERMATOLOGY  for your care. Our goal is always to provide you with excellent care. Hearing back from our patients is one way we can continue to improve our services. Please take a few minutes to complete the written survey that you may receive in the mail after your visit with us. Thank you!             Your Updated Medication List - Protect others around you: Learn how to safely use, store and throw away your medicines at www.disposemymeds.org.          This list is accurate as of 1/25/18 11:49 AM.  Always use your most recent med list.                   Brand Name Dispense Instructions for use Diagnosis    AIRBORNE GUMMIES Chew      Take by mouth daily        IBUPROFEN PO      Reported on 3/4/2017        ketoconazole 2 % shampoo    NIZORAL    120 mL    Massage onto wet skin, let sit 3-5 min in the shower, then rinse. Do this at least 3x weekly.    Folliculitis       loratadine 10 MG tablet    CLARITIN     Take 10 mg by mouth daily        spironolactone 50 MG tablet    ALDACTONE    180 tablet    Take 1 tablet (50 mg) by mouth 2 times daily    Acne  vulgaris       tretinoin 0.025 % cream    RETIN-A    45 g    Apply pea-sized amount to face at bedtime. Start every other night for 1-2 weeks, then increase to nightly as tolerated.    Other acne

## 2018-01-25 NOTE — PATIENT INSTRUCTIONS
"  For your folliculitis, start bleach baths 2-3 times weekly. The instructions are below. Heavy moisturizing cream.     For the hair growth, we will start the spirinolactone again at a lower dose half a pill (25mg) daily. After a week increase to one whole pill a day (50mg daily), then one pill twce a day (50mg twice daily) after one week if you are tolerating that well.  Start with       Bleach Bath Instructions  What are dilute bleach baths?  Dilute bleach baths are used to help fight bacteria that is commonly found on the skin; this bacteria may be preventing your skin from healing. If is also used to calm inflammation in skin, even if infection is not present. The dilution ratio we recommend is the same concentration that is in a swimming pool.     Type;  *Regular, plain household bleach used for cleaning clothing. Brand or Generic is okay.   *Make sure this is plain or concentrated bleach. This should NOT be \"splash free, splash less or color safe.\"   *There should not be any added fragrance to the bleach; such a lavender.    How do I make a dilute bleach bath?  *Fill your tub with lukewarm water with at least 4-6 inches of water.  *Pour 1/4 to 1/2 cup of bleach into an adult size bath tub.  *For smaller tubs (infant tubs), add two tablespoons of bleach to the tub water. * Bleach baths work better if your child is able to submerge most of their skin, so consider placing the infant tub in the larger tub.   *Repeat bleach baths as recommended by your provider.    Other information:  *Do not pour bleach directly onto the skin.  *If is safe to get the bleach mixture on your face and scalp.  *Do not drink the bleach mixture.  *Keep bleach bottle out of reach of children.          "

## 2018-01-26 PROBLEM — L68.0 HIRSUTISM: Status: ACTIVE | Noted: 2018-01-26

## 2018-04-17 ENCOUNTER — OFFICE VISIT (OUTPATIENT)
Dept: DERMATOLOGY | Facility: CLINIC | Age: 28
End: 2018-04-17
Payer: COMMERCIAL

## 2018-04-17 VITALS — DIASTOLIC BLOOD PRESSURE: 71 MMHG | SYSTOLIC BLOOD PRESSURE: 121 MMHG | HEART RATE: 92 BPM

## 2018-04-17 DIAGNOSIS — L70.8 OTHER ACNE: ICD-10-CM

## 2018-04-17 DIAGNOSIS — L70.0 ACNE VULGARIS: ICD-10-CM

## 2018-04-17 RX ORDER — SPIRONOLACTONE 50 MG/1
50 TABLET, FILM COATED ORAL 2 TIMES DAILY
Qty: 180 TABLET | Refills: 3 | Status: SHIPPED | OUTPATIENT
Start: 2018-04-17 | End: 2018-10-16

## 2018-04-17 RX ORDER — TRETINOIN 0.25 MG/G
CREAM TOPICAL
Qty: 45 G | Refills: 11 | Status: SHIPPED | OUTPATIENT
Start: 2018-04-17

## 2018-04-17 ASSESSMENT — PAIN SCALES - GENERAL: PAINLEVEL: NO PAIN (0)

## 2018-04-17 NOTE — MR AVS SNAPSHOT
After Visit Summary   4/17/2018    Mira Mariee    MRN: [id number]           Patient Information     Date Of Birth          1990        Visit Information        Provider Department      4/17/2018 9:15 AM Jacobo Schmid MD [facility] Dermatology        Today's Diagnoses     Acne vulgaris        Other acne          Care Instructions    For bleach baths, the new formulations of Clorox may be more irritating to your skin than before.  Generic bleaches such as [employer] or [employer] are probably going to be less irritating    Moisturizing creams:  1. Cetaphil  2. Cerave  3. Aquaphor  4. Vanicream    Discuss with your OB-GYN the possibility of a low-hormone oral contraceptive to help with acne and hair growth          Follow-ups after your visit        Your next 10 appointments already scheduled     Jul 31, 2018 10:15 AM CDT   (Arrive by 10:00 AM)   Return Visit with Jacobo Schmid MD   [facility] Dermatology ([facility])    [address] [place] [id number] [phone]              Who to contact     Please call your clinic at [phone] to:    Ask questions about your health    Make or cancel appointments    Discuss your medicines    Learn about your test results    Speak to your doctor            Additional Information About Your Visit        [employer] Information     [employer] gives you secure access to your electronic health record. If you see a primary care provider, you can also send messages to your care team and make appointments. If you have questions, please call your primary care clinic.  If you do not have a primary care provider, please call [phone] and they will assist you.      [employer] is an electronic gateway that provides easy, online access to your medical records. With [employer], you can request a clinic appointment, read your test results, renew a prescription or communicate with your care team.     To access your existing  account, please contact your [facility] or call [phone] for assistance.        Care EveryWhere ID     This is your Care EveryWhere ID. This could be used by other organizations to access your [place] medical records  [id number]        Your Vitals Were     Pulse                   92            Blood Pressure from Last 3 Encounters:   04/17/18 121/71   07/29/17 122/77   05/03/17 117/74    Weight from Last 3 Encounters:   07/29/17 62.1 kg (137 lb)   05/03/17 58.1 kg (128 lb)   03/04/17 57.2 kg (126 lb 2 oz)              Today, you had the following     No orders found for display         Today's Medication Changes          These changes are accurate as of 4/17/18 10:02 AM.  If you have any questions, ask your nurse or doctor.               These medicines have changed or have updated prescriptions.        Dose/Directions    spironolactone 50 MG tablet   Commonly known as:  ALDACTONE   This may have changed:  how much to take   Used for:  Acne vulgaris        Dose:  50 mg   Take 1 tablet (50 mg) by mouth 2 times daily   Quantity:  180 tablet   Refills:  3            Where to get your medicines      These medications were [id number] [place], [place] [facility] [address]  [address] [facility] [address]     Phone:  [phone]     spironolactone 50 MG tablet    tretinoin 0.025 % cream                Primary Care Provider Office Phone # Fax #    Marcia Edmond [phone]       [facility] [address] [place] [address]        Equal Access to Services     KATHERYN GONZALEZ AH: Hadii bryant vega hadmaddieo Soyessenia, waaxda luqadaha, qaybta kaalmada adeegyajosé, shadi calderon. So [facility] [phone].    ATENCIÓN: Si habla español, tiene a jaime disposición servicios gratuitos de asistencia lingüística. Llame al [phone].    We comply with applicable federal civil rights laws and [place] laws. We do not discriminate  on the basis of race, color, national origin, age, disability, sex, sexual orientation, or gender identity.            Thank you!     Thank you for choosing [facility] DERMATOLOGY  for your care. Our goal is always to provide you with excellent care. Hearing back from our patients is one way we can continue to improve our services. Please take a few minutes to complete the written survey that you may receive in the mail after your visit with us. Thank you!             Your Updated Medication List - Protect others around you: Learn how to safely use, store and throw away your medicines at www.disposemymeds.org.          This list is accurate as of 4/17/18 10:02 AM.  Always use your most recent med list.                   Brand Name Dispense Instructions for use Diagnosis    AIRBORNE GUMMIES Chew      Take by mouth daily        IBUPROFEN PO      Reported on 3/4/2017        ketoconazole 2 % shampoo    NIZORAL    120 mL    Massage onto wet skin, let sit 3-5 min in the shower, then rinse. Do this at least 3x weekly.    Folliculitis       loratadine 10 MG tablet    CLARITIN     Take 10 mg by mouth daily        spironolactone 50 MG tablet    ALDACTONE    180 tablet    Take 1 tablet (50 mg) by mouth 2 times daily    Acne vulgaris       tretinoin 0.025 % cream    RETIN-A    45 g    Apply pea-sized amount to face at bedtime. Start every other night for 1-2 weeks, then increase to nightly as tolerated.    Other acne

## 2018-04-17 NOTE — PATIENT INSTRUCTIONS
For bleach baths, the new formulations of Clorox may be more irritating to your skin than before.  Generic bleaches such as [employer] or [employer] are probably going to be less irritating    Moisturizing creams:  1. Cetaphil  2. Cerave  3. Aquaphor  4. Vanicream    Discuss with your OB-GYN the possibility of a low-hormone oral contraceptive to help with acne and hair growth

## 2018-04-17 NOTE — NURSING NOTE
Dermatology Rooming Note    Mira Mariee's goals for this visit include:   Chief Complaint   Patient presents with     Derm Problem     Folliculitis - Mira notes great improvement, but not clear     Aleyda Kuhn, CMA

## 2018-04-17 NOTE — PROGRESS NOTES
CHIEF COMPLAINT:  Follow-up on acne and hirsutism.      SUBJECTIVE:  Mira is a very pleasant 27-year-old female with mild hirsutism, as well as acne and folliculitis.  She was last seen in our clinic on 01/25/2018, at which time we recommended topical Tretinoin and bleach baths for her acne and folliculitis, as well as starting spironolactone for both her acne and hirsutism.  Today, Mira reports that she has had overall good improvement in her acne and folliculitis with bleach baths and with low-dose spironolactone, but she has been able to tolerate only small doses of this medication.  Right now she is taking 25 mg once daily.  Attempts to go up to 50 daily resulted in some dizziness.  She is interested in increasing her dose as she has no other side effects but is approaching this cautiously.  Also, she has noticed some modest improvement in hair overgrowth on the chin and arms since starting spironolactone.      REVIEW OF SYSTEMS:  No recent fevers or chills.  No amenorrhea.  She has 12 periods per year.      PHYSICAL EXAMINATION:   GENERAL:  General, this is a well-appearing, well-nourished female with a normal mood and affect who is oriented x3.   SKIN:  A cutaneous exam of the head, neck and bilateral upper extremities was performed and demonstrates scattered hyperpigmented macules and occasional acneiform papules on the cheeks.  She has modest terminal hair growth on the chin.      ASSESSMENT AND PLAN:   1.  Folliculitis and acne.  Mira has done remarkably well with only low-dose spironolactone to date.  We suggested that she should continue bleach baths 2-3 times weekly, topical Tretinoin 0.025% cream at bedtime, as well as continuing spironolactone as tolerated.  I suggested that she may attempt going up to 50 mg once daily, with a maximum dose of 50 mg twice daily.  We reiterated the importance of not becoming pregnant while taking spironolactone and the risks of accidental pregnancy including  feminization of male fetuses.  I encouraged her to discuss with her OB/GYN the possibility of restarting a low-dose oral contraceptive as it may work synergistically with spironolactone.  She is going to do this in the next month.   2.  Hirsutism.  She will continue spironolactone as noted above.   3.  She will follow up in our clinic in 3-4 months' time.       Jacobo Schmid MD  Dermatology Attending

## 2018-04-17 NOTE — LETTER
4/17/2018       RE: Mira Mariee  [address]  [place] [place] [facility] [address]     Dear Colleague,    Thank you for referring your patient, Mira Mariee, to the [facility] DERMATOLOG[facility] at [facility]. Please see a copy of my visit note below.    CHIEF COMPLAINT:  Follow-up on acne and hirsutism.      SUBJECTIVE:  Mira is a very pleasant 27-year-old female with mild hirsutism, as well as acne and folliculitis.  She was last seen in our clinic on 01/25/2018, at which time we recommended topical Tretinoin and bleach baths for her acne and folliculitis, as well as starting spironolactone for both her acne and hirsutism.  Today, Mira reports that she has had overall good improvement in her acne and folliculitis with bleach baths and with low-dose spironolactone, but she has been able to tolerate only small doses of this medication.  Right now she is taking 25 mg once daily.  Attempts to go up to 50 daily resulted in some dizziness.  She is interested in increasing her dose as she has no other side effects but is approaching this cautiously.  Also, she has noticed some modest improvement in hair overgrowth on the chin and arms since starting spironolactone.      REVIEW OF SYSTEMS:  No recent fevers or chills.  No amenorrhea.  She has 12 periods per year.      PHYSICAL EXAMINATION:   GENERAL:  General, this is a well-appearing, well-nourished female with a normal mood and affect who is oriented x3.   SKIN:  A cutaneous exam of the head, neck and bilateral upper extremities was performed and demonstrates scattered hyperpigmented macules and occasional acneiform papules on the cheeks.  She has modest terminal hair growth on the chin.      ASSESSMENT AND PLAN:   1.  Folliculitis and acne.  Mira has done remarkably well with only low-dose spironolactone to date.  We suggested that she should continue bleach baths 2-3 times weekly, topical Tretinoin 0.025% cream at bedtime, as  well as continuing spironolactone as tolerated.  I suggested that she may attempt going up to 50 mg once daily, with a maximum dose of 50 mg twice daily.  We reiterated the importance of not becoming pregnant while taking spironolactone and the risks of accidental pregnancy including feminization of male fetuses.  I encouraged her to discuss with her OB/GYN the possibility of restarting a low-dose oral contraceptive as it may work synergistically with spironolactone.  She is going to do this in the next month.   2.  Hirsutism.  She will continue spironolactone as noted above.   3.  She will follow up in our clinic in 3-4 months' time.       Jacobo Schmid MD  Dermatology Attending

## 2018-10-16 ENCOUNTER — OFFICE VISIT (OUTPATIENT)
Dept: DERMATOLOGY | Facility: CLINIC | Age: 28
End: 2018-10-16
Payer: COMMERCIAL

## 2018-10-16 DIAGNOSIS — L70.0 ACNE VULGARIS: ICD-10-CM

## 2018-10-16 RX ORDER — SPIRONOLACTONE 25 MG/1
25 TABLET ORAL DAILY
Qty: 180 TABLET | Refills: 3 | Status: SHIPPED | OUTPATIENT
Start: 2018-10-16

## 2018-10-16 ASSESSMENT — PAIN SCALES - GENERAL: PAINLEVEL: NO PAIN (0)

## 2018-10-16 NOTE — LETTER
"10/16/2018       RE: Mira Mariee  [address]  [place] [address]     Dear Colleague,    Thank you for referring your patient, Mira Mariee, to the [facility] DERMATOLOGY at [facility]. Please see a copy of my visit note below.    [facility] Dermatology Note    Dermatology Problem List:  1.  Hirsutism on spironolactone and electrolysis  2.  Acne vulgaris and folliculitis on tretinoin cream    Encounter Date: Oct 16, 2018    CC:   Chief Complaint   Patient presents with     Derm Problem     acne and folliculitis follow up, Mira states \" It is a lot better.\"        History of Present Illness:  Ms. Mira Mariee is a 27 year old female who presents as a follow-up for  acne vulgaris, folliculitis, and hirsutism. The patient was last seen 4/17/1 the patient noted improvement of her hirsutism by using electrolysis and noted that this was helpful.  She undergoes electrolysis 1-2 times per week.  She has not taken her tretinoin recently due to undergoing electrolysis.  Further she has not taken spironolactone due to presyncope during the summer in the setting of dehydration.  She intends to restart spironolactone this winter.  She has recently become sexually active and is not currently on contraception, she is getting a new primary care physician who is going to place her on contraception soon.  She will initiate spironolactone after initiation of contraception.  She noted no other side effects from spironolactone.    Past Medical History:   Patient Active Problem List   Diagnosis     Rash     Pruritus     Other acne     Acne vulgaris     Hirsutism     No past medical history on file.  No past surgical history on file.      Medications:  Current Outpatient Prescriptions   Medication Sig Dispense Refill     IBUPROFEN PO Reported on 3/4/2017       ketoconazole (NIZORAL) 2 % shampoo Massage onto wet skin, let sit 3-5 min in the shower, then " rinse. Do this at least 3x weekly. 120 mL 11     loratadine (CLARITIN) 10 MG tablet Take 10 mg by mouth daily       Multiple Vitamins-Minerals (AIRBORNE GUMMIES) CHEW Take by mouth daily       spironolactone (ALDACTONE) 25 MG tablet Take 1 tablet (25 mg) by mouth daily 180 tablet 3     tretinoin (RETIN-A) 0.025 % cream Apply pea-sized amount to face at bedtime. Start every other night for 1-2 weeks, then increase to nightly as tolerated. (Patient not taking: Reported on 10/16/2018) 45 g 11     [DISCONTINUED] spironolactone (ALDACTONE) 50 MG tablet Take 1 tablet (50 mg) by mouth 2 times daily (Patient not taking: Reported on 10/16/2018) 180 tablet 3        Allergies:  Allergies   Allergen Reactions     Advair [Fluticasone-Salmeterol]      Made chest tightness worse     Animal Dander      Nuts      Trees      Social history: currently smokes cigarettes (1ppd)    Review of Systems:  Constitutional: No recent fevers, chills, night sweats.  Skin: As above in HPI. Otherwise no additional skin rashes or changes.  No amenorrhea    Physical exam:  Vitals: There were no vitals taken for this visit.  GEN: This is a well developed, well-nourished female in no acute distress, in a pleasant mood.    SKIN: Acne exam, which includes the face, neck, upper central chest, and upper central back was performed.  -There are minimal superifical acneiform papules with intermixed open and closed comedones on the cheeks bilaterally.   -There is excess hair note on the chin and b/l cheeks.   -No other lesions of concern on areas examined.     Impression/Plan:  1. Hirsutism    Spironolactone dose was decreased to 25 mg daily from 50 mg twice daily due to significant dizziness.  Currently she has discontinued during the summer.  She also noted that she is going to be placed on contraception soon, but since she is currently smoking 1 pack/day of cigarettes she is likely not going to be placed on oral contraception due to risk of DVT.  She was  educated regarding risk of feminization of fetus in the setting of spironolactone, and she will not resume spironolactone treatment until contraception is in place.    She noted significant improvement with electrolysis.    2. Acne vulgaris    Continue tretinoin cream as needed for acne    Continue bleach baths 2-3 times per week      Return to clinic in 6 months    Dr. Schmid staffed the patient.    Joesph De La Garza MD  Internal Medicine PGY-3    I have seen and examined this patient and agree with the assessment and plan as documented in the resident's note.    Jacobo Schmid MD  Dermatology Attending

## 2018-10-16 NOTE — NURSING NOTE
"Dermatology Rooming Note    Mira Mariee's goals for this visit include:   Chief Complaint   Patient presents with     Derm Problem     acne and folliculitis follow up, Mira states \" It is a lot better.\"      Emily Haas LPN   "

## 2018-10-16 NOTE — MR AVS SNAPSHOT
After Visit Summary   10/16/2018    Mira Mariee    MRN: [id number]           Patient Information     Date Of Birth          1990        Visit Information        Provider Department      10/16/2018 10:15 AM Jacobo Schmid MD [facility]        Today's Diagnoses     Acne vulgaris           Follow-ups after your visit        Follow-up notes from your care team     Return in about 6 months (around 2019).      Who to contact     Please call your clinic at [phone] to:    Ask questions about your health    Make or cancel appointments    Discuss your medicines    Learn about your test results    Speak to your doctor            Additional Information About Your Visit        MyChart Information     [employer] is an electronic gateway that provides easy, online access to your medical records. With [employer], you can request a clinic appointment, read your test results, renew a prescription or communicate with your care team.     To sign up for [employer] visit the website at [email or web address]   You will be asked to enter the access code listed below, as well as some personal information. Please follow the directions to create your username and password.     Your access code is: [email or web address]  Expires: 2019  9:49 PM     Your access code will  in 90 days. If you need help or a new code, please contact your [facility] or call [phone] for assistance.        Care EveryWhere ID     This is your Care EveryWhere ID. This could be used by other organizations to access your [place] medical records  [id number]         Blood Pressure from Last 3 Encounters:   18 121/71   17 122/77   17 117/74    Weight from Last 3 Encounters:   17 62.1 kg (137 lb)   17 58.1 kg (128 lb)   17 57.2 kg (126 lb 2 oz)              Today, you had the following     No orders found for display         Today's Medication Changes           These changes are accurate as of 10/16/18 11:59 PM.  If you have any questions, ask your nurse or doctor.               These medicines have changed or have updated prescriptions.        Dose/Directions    spironolactone 25 MG tablet   Commonly known as:  ALDACTONE   This may have changed:    - medication strength  - how much to take  - when to take this   Used for:  Acne vulgaris        Dose:  25 mg   Take 1 tablet (25 mg) by mouth daily   Quantity:  180 tablet   Refills:  3            Where to get your medicines      These medications were sent to [id number] - [place], [place] - [address]  [address] [address]     Phone:  [phone]     spironolactone 25 MG tablet                Primary Care Provider Office Phone # Fax #    Marcia Edmond [phone]       [facility] [address] [place] [address]        Equal Access to Services     [facility] : Hadii bryant vega hadasho Soomaali, waaxda luqadaha, qaybta kaalmada adeegyada, shadi matute . So [facility] [phone].    ATENCIÓN: Si habla español, tiene a jaime disposición servicios gratuitos de asistencia lingüística. Lisasoraida al [phone].    We comply with applicable federal civil rights laws and [place] laws. We do not discriminate on the basis of race, color, national origin, age, disability, sex, sexual orientation, or gender identity.            Thank you!     Thank you for choosing [facility]  for your care. Our goal is always to provide you with excellent care. Hearing back from our patients is one way we can continue to improve our services. Please take a few minutes to complete the written survey that you may receive in the mail after your visit with us. Thank you!             Your Updated Medication List - Protect others around you: Learn how to safely use, store and throw away your medicines at www.disposemymeds.org.          This list is  accurate as of 10/16/18 11:59 PM.  Always use your most recent med list.                   Brand Name Dispense Instructions for use Diagnosis    AIRBORNE GUMMIES Chew      Take by mouth daily        IBUPROFEN PO      Reported on 3/4/2017        ketoconazole 2 % shampoo    NIZORAL    120 mL    Massage onto wet skin, let sit 3-5 min in the shower, then rinse. Do this at least 3x weekly.    Folliculitis       loratadine 10 MG tablet    CLARITIN     Take 10 mg by mouth daily        spironolactone 25 MG tablet    ALDACTONE    180 tablet    Take 1 tablet (25 mg) by mouth daily    Acne vulgaris       tretinoin 0.025 % cream    RETIN-A    45 g    Apply pea-sized amount to face at bedtime. Start every other night for 1-2 weeks, then increase to nightly as tolerated.    Other acne

## 2018-10-16 NOTE — PROGRESS NOTES
"[facility] Dermatology Note    Dermatology Problem List:  1.  Hirsutism on spironolactone and electrolysis  2.  Acne vulgaris and folliculitis on tretinoin cream    Encounter Date: Oct 16, 2018    CC:   Chief Complaint   Patient presents with     Derm Problem     acne and folliculitis follow up, Mira states \" It is a lot better.\"        History of Present Illness:  Ms. Mira Mariee is a 27 year old female who presents as a follow-up for  acne vulgaris, folliculitis, and hirsutism. The patient was last seen 4/17/1 the patient noted improvement of her hirsutism by using electrolysis and noted that this was helpful.  She undergoes electrolysis 1-2 times per week.  She has not taken her tretinoin recently due to undergoing electrolysis.  Further she has not taken spironolactone due to presyncope during the summer in the setting of dehydration.  She intends to restart spironolactone this winter.  She has recently become sexually active and is not currently on contraception, she is getting a new primary care physician who is going to place her on contraception soon.  She will initiate spironolactone after initiation of contraception.  She noted no other side effects from spironolactone.    Past Medical History:   Patient Active Problem List   Diagnosis     Rash     Pruritus     Other acne     Acne vulgaris     Hirsutism     No past medical history on file.  No past surgical history on file.      Medications:  Current Outpatient Prescriptions   Medication Sig Dispense Refill     IBUPROFEN PO Reported on 3/4/2017       ketoconazole (NIZORAL) 2 % shampoo Massage onto wet skin, let sit 3-5 min in the shower, then rinse. Do this at least 3x weekly. 120 mL 11     loratadine (CLARITIN) 10 MG tablet Take 10 mg by mouth daily       Multiple Vitamins-Minerals (AIRBORNE GUMMIES) CHEW Take by mouth daily       spironolactone (ALDACTONE) 25 MG tablet Take 1 tablet (25 mg) by mouth daily 180 tablet 3     " tretinoin (RETIN-A) 0.025 % cream Apply pea-sized amount to face at bedtime. Start every other night for 1-2 weeks, then increase to nightly as tolerated. (Patient not taking: Reported on 10/16/2018) 45 g 11     [DISCONTINUED] spironolactone (ALDACTONE) 50 MG tablet Take 1 tablet (50 mg) by mouth 2 times daily (Patient not taking: Reported on 10/16/2018) 180 tablet 3        Allergies:  Allergies   Allergen Reactions     Advair [Fluticasone-Salmeterol]      Made chest tightness worse     Animal Dander      Nuts      Trees      Social history: currently smokes cigarettes (1ppd)    Review of Systems:  Constitutional: No recent fevers, chills, night sweats.  Skin: As above in HPI. Otherwise no additional skin rashes or changes.  No amenorrhea    Physical exam:  Vitals: There were no vitals taken for this visit.  GEN: This is a well developed, well-nourished female in no acute distress, in a pleasant mood.    SKIN: Acne exam, which includes the face, neck, upper central chest, and upper central back was performed.  -There are minimal superifical acneiform papules with intermixed open and closed comedones on the cheeks bilaterally.   -There is excess hair note on the chin and b/l cheeks.   -No other lesions of concern on areas examined.     Impression/Plan:  1. Hirsutism    Spironolactone dose was decreased to 25 mg daily from 50 mg twice daily due to significant dizziness.  Currently she has discontinued during the summer.  She also noted that she is going to be placed on contraception soon, but since she is currently smoking 1 pack/day of cigarettes she is likely not going to be placed on oral contraception due to risk of DVT.  She was educated regarding risk of feminization of fetus in the setting of spironolactone, and she will not resume spironolactone treatment until contraception is in place.    She noted significant improvement with electrolysis.    2. Acne vulgaris    Continue tretinoin cream as needed for  acne    Continue bleach baths 2-3 times per week      Return to clinic in 6 months    Dr. Schmid staffed the patient.    Joesph De La Garza MD  Internal Medicine PGY-3    I have seen and examined this patient and agree with the assessment and plan as documented in the resident's note.    Jacobo Schmid MD  Dermatology Attending